# Patient Record
Sex: MALE | Race: WHITE | Employment: OTHER | URBAN - METROPOLITAN AREA
[De-identification: names, ages, dates, MRNs, and addresses within clinical notes are randomized per-mention and may not be internally consistent; named-entity substitution may affect disease eponyms.]

---

## 2017-02-27 ENCOUNTER — ANESTHESIA EVENT (OUTPATIENT)
Dept: SURGERY | Facility: CLINIC | Age: 67
DRG: 470 | End: 2017-02-27
Payer: MEDICARE

## 2017-02-27 ENCOUNTER — HOSPITAL ENCOUNTER (INPATIENT)
Facility: CLINIC | Age: 67
LOS: 1 days | Discharge: HOME | DRG: 470 | End: 2017-02-28
Attending: ORTHOPAEDIC SURGERY | Admitting: ORTHOPAEDIC SURGERY
Payer: MEDICARE

## 2017-02-27 ENCOUNTER — ANESTHESIA (OUTPATIENT)
Dept: SURGERY | Facility: CLINIC | Age: 67
DRG: 470 | End: 2017-02-27
Payer: MEDICARE

## 2017-02-27 ENCOUNTER — APPOINTMENT (OUTPATIENT)
Dept: GENERAL RADIOLOGY | Facility: CLINIC | Age: 67
DRG: 470 | End: 2017-02-27
Attending: ORTHOPAEDIC SURGERY
Payer: MEDICARE

## 2017-02-27 DIAGNOSIS — M19.172 POST-TRAUMATIC ARTHRITIS OF ANKLE, LEFT: Primary | ICD-10-CM

## 2017-02-27 PROCEDURE — 71000012 ZZH RECOVERY PHASE 1 LEVEL 1 FIRST HR: Performed by: ORTHOPAEDIC SURGERY

## 2017-02-27 PROCEDURE — 25000128 H RX IP 250 OP 636: Performed by: NURSE ANESTHETIST, CERTIFIED REGISTERED

## 2017-02-27 PROCEDURE — 25000128 H RX IP 250 OP 636: Performed by: ORTHOPAEDIC SURGERY

## 2017-02-27 PROCEDURE — 25000132 ZZH RX MED GY IP 250 OP 250 PS 637: Mod: GY | Performed by: ORTHOPAEDIC SURGERY

## 2017-02-27 PROCEDURE — 25800025 ZZH RX 258: Performed by: ANESTHESIOLOGY

## 2017-02-27 PROCEDURE — A9270 NON-COVERED ITEM OR SERVICE: HCPCS | Mod: GY | Performed by: ORTHOPAEDIC SURGERY

## 2017-02-27 PROCEDURE — 71000013 ZZH RECOVERY PHASE 1 LEVEL 1 EA ADDTL HR: Performed by: ORTHOPAEDIC SURGERY

## 2017-02-27 PROCEDURE — C1713 ANCHOR/SCREW BN/BN,TIS/BN: HCPCS | Performed by: ORTHOPAEDIC SURGERY

## 2017-02-27 PROCEDURE — 36000063 ZZH SURGERY LEVEL 4 EA 15 ADDTL MIN: Performed by: ORTHOPAEDIC SURGERY

## 2017-02-27 PROCEDURE — 37000009 ZZH ANESTHESIA TECHNICAL FEE, EACH ADDTL 15 MIN: Performed by: ORTHOPAEDIC SURGERY

## 2017-02-27 PROCEDURE — 40000277 XR SURGERY CARM FLUORO LESS THAN 5 MIN W STILLS: Mod: TC

## 2017-02-27 PROCEDURE — 40000171 ZZH STATISTIC PRE-PROCEDURE ASSESSMENT III: Performed by: ORTHOPAEDIC SURGERY

## 2017-02-27 PROCEDURE — 37000008 ZZH ANESTHESIA TECHNICAL FEE, 1ST 30 MIN: Performed by: ORTHOPAEDIC SURGERY

## 2017-02-27 PROCEDURE — 36000065 ZZH SURGERY LEVEL 4 W FLUORO 1ST 30 MIN: Performed by: ORTHOPAEDIC SURGERY

## 2017-02-27 PROCEDURE — 25000125 ZZHC RX 250: Performed by: ANESTHESIOLOGY

## 2017-02-27 PROCEDURE — 0SRG0JA REPLACEMENT OF LEFT ANKLE JOINT WITH SYNTHETIC SUBSTITUTE, UNCEMENTED, OPEN APPROACH: ICD-10-PCS | Performed by: ORTHOPAEDIC SURGERY

## 2017-02-27 PROCEDURE — 25000566 ZZH SEVOFLURANE, EA 15 MIN: Performed by: ORTHOPAEDIC SURGERY

## 2017-02-27 PROCEDURE — 12000011 ZZH R&B MS OVERFLOW

## 2017-02-27 PROCEDURE — 25800025 ZZH RX 258: Performed by: ORTHOPAEDIC SURGERY

## 2017-02-27 PROCEDURE — 25000125 ZZHC RX 250: Performed by: ORTHOPAEDIC SURGERY

## 2017-02-27 PROCEDURE — 27211024 ZZHC OR SUPPLY OTHER OPNP: Performed by: ORTHOPAEDIC SURGERY

## 2017-02-27 PROCEDURE — 27210995 ZZH RX 272: Performed by: ORTHOPAEDIC SURGERY

## 2017-02-27 PROCEDURE — C1776 JOINT DEVICE (IMPLANTABLE): HCPCS | Performed by: ORTHOPAEDIC SURGERY

## 2017-02-27 PROCEDURE — 0SGJ04Z FUSION OF LEFT TARSAL JOINT WITH INTERNAL FIXATION DEVICE, OPEN APPROACH: ICD-10-PCS | Performed by: ORTHOPAEDIC SURGERY

## 2017-02-27 PROCEDURE — 27210794 ZZH OR GENERAL SUPPLY STERILE: Performed by: ORTHOPAEDIC SURGERY

## 2017-02-27 PROCEDURE — 0L8P0ZZ DIVISION OF LEFT LOWER LEG TENDON, OPEN APPROACH: ICD-10-PCS | Performed by: ORTHOPAEDIC SURGERY

## 2017-02-27 PROCEDURE — 25000125 ZZHC RX 250: Performed by: NURSE ANESTHETIST, CERTIFIED REGISTERED

## 2017-02-27 DEVICE — IMPLANTABLE DEVICE: Type: IMPLANTABLE DEVICE | Site: FOOT | Status: FUNCTIONAL

## 2017-02-27 RX ORDER — HYDROMORPHONE HCL/0.9% NACL/PF 0.2MG/0.2
.2-.3 SYRINGE (ML) INTRAVENOUS
Status: DISCONTINUED | OUTPATIENT
Start: 2017-02-27 | End: 2017-02-28 | Stop reason: HOSPADM

## 2017-02-27 RX ORDER — MAGNESIUM HYDROXIDE 1200 MG/15ML
LIQUID ORAL PRN
Status: DISCONTINUED | OUTPATIENT
Start: 2017-02-27 | End: 2017-02-27 | Stop reason: HOSPADM

## 2017-02-27 RX ORDER — CEFAZOLIN SODIUM 2 G/100ML
2 INJECTION, SOLUTION INTRAVENOUS
Status: COMPLETED | OUTPATIENT
Start: 2017-02-27 | End: 2017-02-27

## 2017-02-27 RX ORDER — ACETAMINOPHEN 325 MG/1
975 TABLET ORAL EVERY 8 HOURS
Status: DISCONTINUED | OUTPATIENT
Start: 2017-02-27 | End: 2017-02-28 | Stop reason: HOSPADM

## 2017-02-27 RX ORDER — TRIAMCINOLONE ACETONIDE 40 MG/ML
INJECTION, SUSPENSION INTRA-ARTICULAR; INTRAMUSCULAR PRN
Status: DISCONTINUED | OUTPATIENT
Start: 2017-02-27 | End: 2017-02-27 | Stop reason: HOSPADM

## 2017-02-27 RX ORDER — CEFAZOLIN SODIUM 2 G/100ML
2 INJECTION, SOLUTION INTRAVENOUS EVERY 8 HOURS
Status: COMPLETED | OUTPATIENT
Start: 2017-02-27 | End: 2017-02-28

## 2017-02-27 RX ORDER — DIMENHYDRINATE 50 MG/ML
25 INJECTION, SOLUTION INTRAMUSCULAR; INTRAVENOUS
Status: DISCONTINUED | OUTPATIENT
Start: 2017-02-27 | End: 2017-02-27 | Stop reason: HOSPADM

## 2017-02-27 RX ORDER — SODIUM CHLORIDE, SODIUM LACTATE, POTASSIUM CHLORIDE, CALCIUM CHLORIDE 600; 310; 30; 20 MG/100ML; MG/100ML; MG/100ML; MG/100ML
INJECTION, SOLUTION INTRAVENOUS CONTINUOUS
Status: DISCONTINUED | OUTPATIENT
Start: 2017-02-27 | End: 2017-02-27 | Stop reason: HOSPADM

## 2017-02-27 RX ORDER — OXYCODONE HYDROCHLORIDE 5 MG/1
5-10 TABLET ORAL EVERY 4 HOURS PRN
Status: DISCONTINUED | OUTPATIENT
Start: 2017-02-27 | End: 2017-02-28 | Stop reason: HOSPADM

## 2017-02-27 RX ORDER — HYDROXYZINE HYDROCHLORIDE 10 MG/1
10 TABLET, FILM COATED ORAL EVERY 6 HOURS PRN
Status: DISCONTINUED | OUTPATIENT
Start: 2017-02-27 | End: 2017-02-28 | Stop reason: HOSPADM

## 2017-02-27 RX ORDER — DEXAMETHASONE SODIUM PHOSPHATE 4 MG/ML
INJECTION, SOLUTION INTRA-ARTICULAR; INTRALESIONAL; INTRAMUSCULAR; INTRAVENOUS; SOFT TISSUE PRN
Status: DISCONTINUED | OUTPATIENT
Start: 2017-02-27 | End: 2017-02-27

## 2017-02-27 RX ORDER — CEFAZOLIN SODIUM 1 G/3ML
1 INJECTION, POWDER, FOR SOLUTION INTRAMUSCULAR; INTRAVENOUS SEE ADMIN INSTRUCTIONS
Status: DISCONTINUED | OUTPATIENT
Start: 2017-02-27 | End: 2017-02-27 | Stop reason: HOSPADM

## 2017-02-27 RX ORDER — EPHEDRINE SULFATE 50 MG/ML
INJECTION, SOLUTION INTRAVENOUS PRN
Status: DISCONTINUED | OUTPATIENT
Start: 2017-02-27 | End: 2017-02-27

## 2017-02-27 RX ORDER — PROCHLORPERAZINE MALEATE 5 MG
5 TABLET ORAL EVERY 6 HOURS PRN
Status: DISCONTINUED | OUTPATIENT
Start: 2017-02-27 | End: 2017-02-28 | Stop reason: HOSPADM

## 2017-02-27 RX ORDER — PROPOFOL 10 MG/ML
INJECTION, EMULSION INTRAVENOUS CONTINUOUS PRN
Status: DISCONTINUED | OUTPATIENT
Start: 2017-02-27 | End: 2017-02-27

## 2017-02-27 RX ORDER — FENTANYL CITRATE 50 UG/ML
INJECTION, SOLUTION INTRAMUSCULAR; INTRAVENOUS PRN
Status: DISCONTINUED | OUTPATIENT
Start: 2017-02-27 | End: 2017-02-27

## 2017-02-27 RX ORDER — LIDOCAINE 40 MG/G
CREAM TOPICAL
Status: DISCONTINUED | OUTPATIENT
Start: 2017-02-27 | End: 2017-02-28 | Stop reason: HOSPADM

## 2017-02-27 RX ORDER — HYDROMORPHONE HYDROCHLORIDE 1 MG/ML
.3-.5 INJECTION, SOLUTION INTRAMUSCULAR; INTRAVENOUS; SUBCUTANEOUS EVERY 5 MIN PRN
Status: DISCONTINUED | OUTPATIENT
Start: 2017-02-27 | End: 2017-02-27 | Stop reason: HOSPADM

## 2017-02-27 RX ORDER — PROPOFOL 10 MG/ML
INJECTION, EMULSION INTRAVENOUS PRN
Status: DISCONTINUED | OUTPATIENT
Start: 2017-02-27 | End: 2017-02-27

## 2017-02-27 RX ORDER — ASPIRIN 81 MG/1
162 TABLET ORAL DAILY
Status: DISCONTINUED | OUTPATIENT
Start: 2017-02-27 | End: 2017-02-28 | Stop reason: HOSPADM

## 2017-02-27 RX ORDER — HYDRALAZINE HYDROCHLORIDE 20 MG/ML
2.5-5 INJECTION INTRAMUSCULAR; INTRAVENOUS EVERY 10 MIN PRN
Status: DISCONTINUED | OUTPATIENT
Start: 2017-02-27 | End: 2017-02-27 | Stop reason: HOSPADM

## 2017-02-27 RX ORDER — FENTANYL CITRATE 50 UG/ML
25-50 INJECTION, SOLUTION INTRAMUSCULAR; INTRAVENOUS
Status: DISCONTINUED | OUTPATIENT
Start: 2017-02-27 | End: 2017-02-27 | Stop reason: HOSPADM

## 2017-02-27 RX ORDER — ONDANSETRON 2 MG/ML
INJECTION INTRAMUSCULAR; INTRAVENOUS PRN
Status: DISCONTINUED | OUTPATIENT
Start: 2017-02-27 | End: 2017-02-27

## 2017-02-27 RX ORDER — LABETALOL HYDROCHLORIDE 5 MG/ML
10 INJECTION, SOLUTION INTRAVENOUS
Status: DISCONTINUED | OUTPATIENT
Start: 2017-02-27 | End: 2017-02-27 | Stop reason: HOSPADM

## 2017-02-27 RX ORDER — ONDANSETRON 2 MG/ML
4 INJECTION INTRAMUSCULAR; INTRAVENOUS EVERY 30 MIN PRN
Status: DISCONTINUED | OUTPATIENT
Start: 2017-02-27 | End: 2017-02-27 | Stop reason: HOSPADM

## 2017-02-27 RX ORDER — NALOXONE HYDROCHLORIDE 0.4 MG/ML
.1-.4 INJECTION, SOLUTION INTRAMUSCULAR; INTRAVENOUS; SUBCUTANEOUS
Status: DISCONTINUED | OUTPATIENT
Start: 2017-02-27 | End: 2017-02-28 | Stop reason: HOSPADM

## 2017-02-27 RX ORDER — ONDANSETRON 4 MG/1
4 TABLET, ORALLY DISINTEGRATING ORAL EVERY 30 MIN PRN
Status: DISCONTINUED | OUTPATIENT
Start: 2017-02-27 | End: 2017-02-27 | Stop reason: HOSPADM

## 2017-02-27 RX ORDER — ONDANSETRON 2 MG/ML
4 INJECTION INTRAMUSCULAR; INTRAVENOUS EVERY 6 HOURS PRN
Status: DISCONTINUED | OUTPATIENT
Start: 2017-02-27 | End: 2017-02-28 | Stop reason: HOSPADM

## 2017-02-27 RX ORDER — ONDANSETRON 4 MG/1
4 TABLET, ORALLY DISINTEGRATING ORAL EVERY 6 HOURS PRN
Status: DISCONTINUED | OUTPATIENT
Start: 2017-02-27 | End: 2017-02-28 | Stop reason: HOSPADM

## 2017-02-27 RX ORDER — LIDOCAINE 40 MG/G
CREAM TOPICAL
Status: DISCONTINUED | OUTPATIENT
Start: 2017-02-27 | End: 2017-02-27 | Stop reason: HOSPADM

## 2017-02-27 RX ORDER — LIDOCAINE HYDROCHLORIDE 10 MG/ML
INJECTION, SOLUTION INFILTRATION; PERINEURAL PRN
Status: DISCONTINUED | OUTPATIENT
Start: 2017-02-27 | End: 2017-02-27

## 2017-02-27 RX ORDER — GABAPENTIN 100 MG/1
300 CAPSULE ORAL 3 TIMES DAILY
Status: DISCONTINUED | OUTPATIENT
Start: 2017-02-27 | End: 2017-02-28 | Stop reason: HOSPADM

## 2017-02-27 RX ORDER — AMOXICILLIN 250 MG
1-2 CAPSULE ORAL 2 TIMES DAILY
Status: DISCONTINUED | OUTPATIENT
Start: 2017-02-27 | End: 2017-02-28 | Stop reason: HOSPADM

## 2017-02-27 RX ORDER — IBUPROFEN 600 MG/1
600 TABLET, FILM COATED ORAL EVERY 6 HOURS PRN
Status: DISCONTINUED | OUTPATIENT
Start: 2017-02-27 | End: 2017-02-28 | Stop reason: HOSPADM

## 2017-02-27 RX ORDER — ACETAMINOPHEN 325 MG/1
650 TABLET ORAL EVERY 4 HOURS PRN
Status: DISCONTINUED | OUTPATIENT
Start: 2017-03-02 | End: 2017-02-28 | Stop reason: HOSPADM

## 2017-02-27 RX ORDER — GLYCOPYRROLATE 0.2 MG/ML
INJECTION, SOLUTION INTRAMUSCULAR; INTRAVENOUS PRN
Status: DISCONTINUED | OUTPATIENT
Start: 2017-02-27 | End: 2017-02-27

## 2017-02-27 RX ORDER — SODIUM CHLORIDE, SODIUM LACTATE, POTASSIUM CHLORIDE, CALCIUM CHLORIDE 600; 310; 30; 20 MG/100ML; MG/100ML; MG/100ML; MG/100ML
INJECTION, SOLUTION INTRAVENOUS CONTINUOUS
Status: DISCONTINUED | OUTPATIENT
Start: 2017-02-27 | End: 2017-02-28 | Stop reason: HOSPADM

## 2017-02-27 RX ADMIN — GLYCOPYRROLATE 0.2 MG: 0.2 INJECTION, SOLUTION INTRAMUSCULAR; INTRAVENOUS at 12:32

## 2017-02-27 RX ADMIN — DEXAMETHASONE SODIUM PHOSPHATE 4 MG: 4 INJECTION, SOLUTION INTRAMUSCULAR; INTRAVENOUS at 12:32

## 2017-02-27 RX ADMIN — EPHEDRINE SULFATE 5 MG: 50 INJECTION, SOLUTION INTRAMUSCULAR; INTRAVENOUS; SUBCUTANEOUS at 12:43

## 2017-02-27 RX ADMIN — OXYCODONE HYDROCHLORIDE 10 MG: 5 TABLET ORAL at 18:53

## 2017-02-27 RX ADMIN — SODIUM CHLORIDE, POTASSIUM CHLORIDE, SODIUM LACTATE AND CALCIUM CHLORIDE: 600; 310; 30; 20 INJECTION, SOLUTION INTRAVENOUS at 17:49

## 2017-02-27 RX ADMIN — CEFAZOLIN SODIUM 2 G: 2 INJECTION, SOLUTION INTRAVENOUS at 20:18

## 2017-02-27 RX ADMIN — PROPOFOL 50 MCG/KG/MIN: 10 INJECTION, EMULSION INTRAVENOUS at 12:49

## 2017-02-27 RX ADMIN — GABAPENTIN 300 MG: 100 CAPSULE ORAL at 20:17

## 2017-02-27 RX ADMIN — ONDANSETRON 4 MG: 2 INJECTION INTRAMUSCULAR; INTRAVENOUS at 15:03

## 2017-02-27 RX ADMIN — EPHEDRINE SULFATE 5 MG: 50 INJECTION, SOLUTION INTRAMUSCULAR; INTRAVENOUS; SUBCUTANEOUS at 14:53

## 2017-02-27 RX ADMIN — LIDOCAINE HYDROCHLORIDE 10 ML: 20 JELLY TOPICAL at 17:06

## 2017-02-27 RX ADMIN — EPHEDRINE SULFATE 5 MG: 50 INJECTION, SOLUTION INTRAMUSCULAR; INTRAVENOUS; SUBCUTANEOUS at 12:51

## 2017-02-27 RX ADMIN — LIDOCAINE HYDROCHLORIDE 50 MG: 10 INJECTION, SOLUTION INFILTRATION; PERINEURAL at 12:32

## 2017-02-27 RX ADMIN — CEFAZOLIN 1 G: 1 INJECTION, POWDER, FOR SOLUTION INTRAMUSCULAR; INTRAVENOUS at 14:29

## 2017-02-27 RX ADMIN — PHENYLEPHRINE HYDROCHLORIDE 100 MCG: 10 INJECTION, SOLUTION INTRAMUSCULAR; INTRAVENOUS; SUBCUTANEOUS at 15:00

## 2017-02-27 RX ADMIN — ASPIRIN 162 MG: 81 TABLET, COATED ORAL at 17:50

## 2017-02-27 RX ADMIN — PHENYLEPHRINE HYDROCHLORIDE 100 MCG: 10 INJECTION, SOLUTION INTRAMUSCULAR; INTRAVENOUS; SUBCUTANEOUS at 13:20

## 2017-02-27 RX ADMIN — HYDROMORPHONE HYDROCHLORIDE 1 MG: 1 INJECTION, SOLUTION INTRAMUSCULAR; INTRAVENOUS; SUBCUTANEOUS at 14:44

## 2017-02-27 RX ADMIN — CEFAZOLIN SODIUM 2 G: 2 INJECTION, SOLUTION INTRAVENOUS at 12:30

## 2017-02-27 RX ADMIN — EPHEDRINE SULFATE 10 MG: 50 INJECTION, SOLUTION INTRAMUSCULAR; INTRAVENOUS; SUBCUTANEOUS at 13:05

## 2017-02-27 RX ADMIN — PHENYLEPHRINE HYDROCHLORIDE 100 MCG: 10 INJECTION, SOLUTION INTRAMUSCULAR; INTRAVENOUS; SUBCUTANEOUS at 15:24

## 2017-02-27 RX ADMIN — ACETAMINOPHEN 975 MG: 325 TABLET, FILM COATED ORAL at 17:50

## 2017-02-27 RX ADMIN — SODIUM CHLORIDE, POTASSIUM CHLORIDE, SODIUM LACTATE AND CALCIUM CHLORIDE: 600; 310; 30; 20 INJECTION, SOLUTION INTRAVENOUS at 15:21

## 2017-02-27 RX ADMIN — OXYCODONE HYDROCHLORIDE 10 MG: 5 TABLET ORAL at 23:21

## 2017-02-27 RX ADMIN — PROPOFOL 200 MG: 10 INJECTION, EMULSION INTRAVENOUS at 12:32

## 2017-02-27 RX ADMIN — FENTANYL CITRATE 100 MCG: 50 INJECTION, SOLUTION INTRAMUSCULAR; INTRAVENOUS at 12:32

## 2017-02-27 RX ADMIN — SODIUM CHLORIDE, POTASSIUM CHLORIDE, SODIUM LACTATE AND CALCIUM CHLORIDE: 600; 310; 30; 20 INJECTION, SOLUTION INTRAVENOUS at 12:30

## 2017-02-27 RX ADMIN — SODIUM CHLORIDE, POTASSIUM CHLORIDE, SODIUM LACTATE AND CALCIUM CHLORIDE: 600; 310; 30; 20 INJECTION, SOLUTION INTRAVENOUS at 13:21

## 2017-02-27 RX ADMIN — MIDAZOLAM HYDROCHLORIDE 2 MG: 1 INJECTION, SOLUTION INTRAMUSCULAR; INTRAVENOUS at 12:30

## 2017-02-27 RX ADMIN — SENNOSIDES AND DOCUSATE SODIUM 1 TABLET: 8.6; 5 TABLET ORAL at 20:20

## 2017-02-27 ASSESSMENT — PAIN DESCRIPTION - DESCRIPTORS: DESCRIPTORS: DULL;THROBBING

## 2017-02-27 NOTE — ANESTHESIA CARE TRANSFER NOTE
Patient: Donny Cleveland    Procedure(s):  Left total ankle arthroplasty, left subtalor fusion        - Wound Class: I-Clean    Diagnosis: left DJD,   Diagnosis Additional Information: No value filed.    Anesthesia Type:   General, Periph. Nerve Block for postop pain, ETT     Note:  Airway :Face Mask  Patient transferred to:PACU        Vitals: (Last set prior to Anesthesia Care Transfer)    CRNA VITALS  2/27/2017 1514 - 2/27/2017 1550      2/27/2017             Pulse: 84    SpO2: 100 %                Electronically Signed By: SHARON Dewitt CRNA  February 27, 2017  3:50 PM

## 2017-02-27 NOTE — ANESTHESIA PREPROCEDURE EVALUATION
Anesthesia Evaluation     . Pt has had prior anesthetic. Type: General      ROS/MED HX    ENT/Pulmonary:     (+)Intermittent asthma Treatment: Inhaler prn,  , . .    Neurologic:  - neg neurologic ROS     Cardiovascular:  - neg cardiovascular ROS       METS/Exercise Tolerance:     Hematologic:  - neg hematologic  ROS       Musculoskeletal:   (+) , , other musculoskeletal- ankle pain      GI/Hepatic:  - neg GI/hepatic ROS       Renal/Genitourinary:  - ROS Renal section negative       Endo:  - neg endo ROS       Psychiatric:  - neg psychiatric ROS       Infectious Disease:  - neg infectious disease ROS       Malignancy:      - no malignancy   Other:    (+) No chance of pregnancy C-spine cleared: N/A, H/O Chronic Pain,no other significant disability              Physical Exam  Normal systems: cardiovascular, pulmonary and dental    Airway   Mallampati: II  TM distance: >3 FB  Neck ROM: full    Dental     Cardiovascular       Pulmonary                     Anesthesia Plan      History & Physical Review  History and physical reviewed and following examination; no interval change.    ASA Status:  2 .    NPO Status:  > 8 hours    Plan for General, Periph. Nerve Block for postop pain and ETT with Intravenous induction. Maintenance will be Balanced.    PONV prophylaxis:  Ondansetron (or other 5HT-3) and Dexamethasone or Solumedrol       Postoperative Care  Postoperative pain management:  IV analgesics.      Consents  Anesthetic plan, risks, benefits and alternatives discussed with:  Patient.  Use of blood products discussed: Yes.   Use of blood products discussed with Patient.  .                          .

## 2017-02-27 NOTE — ANESTHESIA POSTPROCEDURE EVALUATION
Patient: Donny Cleveland    Procedure(s):  Left total ankle arthroplasty, left subtalor fusion        - Wound Class: I-Clean    Diagnosis:left DJD,   Diagnosis Additional Information: Pre-operative diagnosis: Left ankle arthritis  Left subtalar joint arthritis   Post-operative diagnosis Left ankle arthritis  Left subtalar joint arthritis  Achilles tendon contracture, left  Procedure: 1) Left total ankle arthroplasty  2) Left subta, lar joint arthrodesis  3) TendoAchilles lengthening, left        Anesthesia Type:  General, ETT, Peripheral Nerve Block for post-op pain at surgeon's request    Note:  Anesthesia Post Evaluation    Patient location during evaluation: PACU  Patient participation: Able to fully participate in evaluation  Level of consciousness: awake and alert  Pain management: adequate  Airway patency: patent  Cardiovascular status: acceptable  Respiratory status: acceptable  Hydration status: acceptable  PONV: none     Anesthetic complications: None          Last vitals:  Vitals:    02/27/17 1630 02/27/17 1640 02/27/17 1645   BP: 116/77     Resp: 24 12 23   Temp:      SpO2: 95% 97% 96%         Electronically Signed By: Cosme Capps MD  February 27, 2017  4:56 PM

## 2017-02-27 NOTE — IP AVS SNAPSHOT
MRN:[id number]                      After Visit Summary   2/27/2017    Donny Cleveland    MRN: [id number]           Thank you!     Thank you for choosing [facility] for your care. Our goal is always to provide you with excellent care. Hearing back from our patients is one way we can continue to improve our services. Please take a few minutes to complete the written survey that you may receive in the mail after you visit. If you would like to speak to someone directly about your visit please contact Patient Relations at [phone]. Thank you!          Patient Information     Date Of Birth          1950        About your hospital stay     You were admitted on:  February 27, 2017 You last received care in the:  [facility]    You were discharged on:  February 28, 2017        Reason for your hospital stay       Patient was admitted following a left total ankle arthroplasty and subtalar arthrodesis.  He has done well post-operatively and would like to go home. Please see chart for full details                  Who to Call     For medical emergencies, please call 911.  For non-urgent questions about your medical care, please call your primary care provider or clinic, [phone]  For questions related to your surgery, please call your surgery clinic        Attending Provider     Provider Lorenzo Choudhary MD Orthopaedic Surgery       Primary Care Provider Office Phone # Fax #    Tommy Piedra [phone]       [facility] [address] [facility] [address]         When to contact your care team       Call Dr Cope if you have any of the following: temperature greater than 100, increased pain, increased shortness of breath or cast concerns                  After Care Instructions     Activity       Your activity upon discharge: bedrest, elevate at heart level for edema control.  No weightbearing left ankle full  "time            Diet       Follow this diet upon discharge: Regular, get plenty of fiber and water while on narcotic pain medication            Wound care and dressings       Instructions to care for your wound at home: keep cast clean and dry.                  Follow-up Appointments     Follow-up and recommended labs and tests        Follow up with Dr. Cope's office, at [address] [place] [address], within 2 weeks  to evaluate after surgery. No follow up labs or test are needed.                  Pending Results     No orders found for last 3 day(s).            Statement of Approval     Ordered          17 1410  I have reviewed and agree with all the recommendations and orders detailed in this document.  EFFECTIVE NOW     Approved and electronically signed by:  Aminata Walker PA-C             Admission Information     Date & Time Provider Department Dept. Phone    2017 Lorenzo Cope MD [facility] [phone]      Your Vitals Were     Blood Pressure Temperature Respirations Height Weight Pulse Oximetry    136/90 (BP Location: Right arm) 98.5  F (36.9  C) (Oral) 16 1.829 m (6') 112 kg (247 lb) 100%    BMI (Body Mass Index)                   33.5 kg/m2           MyChart Information     [employer] lets you send messages to your doctor, view your test results, renew your prescriptions, schedule appointments and more. To sign up, go to [email or web address] . Click on \"Log in\" on the left side of the screen, which will take you to the Welcome page. Then click on \"Sign up Now\" on the right side of the page.     You will be asked to enter the access code listed below, as well as some personal information. Please follow the directions to create your username and password.     Your access code is: [email or web address]  Expires: 2017  2:16 PM     Your access code will  in 90 days. If you need help or a new code, please call your [facility] clinic or " [phone].        Care EveryWhere ID     This is your Care EveryWhere ID. This could be used by other organizations to access your [facility] medical records  [id number]           Review of your medicines      START taking        Dose / Directions    acetaminophen 325 MG tablet   Commonly known as:  TYLENOL        Dose:  975 mg   Take 3 tablets (975 mg) by mouth every 8 hours   Quantity:  50 tablet   Refills:  1       aspirin 162 MG EC tablet        Dose:  162 mg   Take 1 tablet (162 mg) by mouth daily   Quantity:  14 tablet   Refills:  0       gabapentin 300 MG capsule   Commonly known as:  NEURONTIN        Dose:  300 mg   Take 1 capsule (300 mg) by mouth 3 times daily   Quantity:  9 capsule   Refills:  0       ibuprofen 600 MG tablet   Commonly known as:  ADVIL/MOTRIN        Dose:  600 mg   Take 1 tablet (600 mg) by mouth every 6 hours as needed for moderate pain   Quantity:  50 tablet   Refills:  1       oxyCODONE 5 MG IR tablet   Commonly known as:  ROXICODONE        Dose:  5-10 mg   Take 1-2 tablets (5-10 mg) by mouth every 4 hours as needed for moderate to severe pain   Quantity:  24 tablet   Refills:  0       senna-docusate 8.6-50 MG per tablet   Commonly known as:  SENOKOT-S;PERICOLACE        Dose:  1-2 tablet   Take 1-2 tablets by mouth 2 times daily   Quantity:  40 tablet   Refills:  1            Where to get your medicines      Some of these will need a paper prescription and others can be bought over the counter. Ask your nurse if you have questions.     You don't need a prescription for these medications     acetaminophen 325 MG tablet    aspirin 162 MG EC tablet    gabapentin 300 MG capsule    ibuprofen 600 MG tablet    senna-docusate 8.6-50 MG per tablet         Information about where to get these medications is not yet available     ! Ask your nurse or doctor about these medications     oxyCODONE 5 MG IR tablet                Protect others around you: Learn how to safely use, store and throw  away your medicines at www.disposemymeds.org.             Medication List: This is a list of all your medications and when to take them. Check marks below indicate your daily home schedule. Keep this list as a reference.      Medications           Morning Afternoon Evening Bedtime As Needed    acetaminophen 325 MG tablet   Commonly known as:  TYLENOL   Take 3 tablets (975 mg) by mouth every 8 hours   Last time this was given:  975 mg on 2/28/2017  9:35 AM                                aspirin 162 MG EC tablet   Take 1 tablet (162 mg) by mouth daily   Last time this was given:  162 mg on 2/28/2017  9:36 AM                                gabapentin 300 MG capsule   Commonly known as:  NEURONTIN   Take 1 capsule (300 mg) by mouth 3 times daily   Last time this was given:  300 mg on 2/28/2017  1:28 PM                                ibuprofen 600 MG tablet   Commonly known as:  ADVIL/MOTRIN   Take 1 tablet (600 mg) by mouth every 6 hours as needed for moderate pain   Last time this was given:  600 mg on 2/28/2017 12:16 PM                                oxyCODONE 5 MG IR tablet   Commonly known as:  ROXICODONE   Take 1-2 tablets (5-10 mg) by mouth every 4 hours as needed for moderate to severe pain   Last time this was given:  10 mg on 2/28/2017  1:28 PM                                senna-docusate 8.6-50 MG per tablet   Commonly known as:  SENOKOT-S;PERICOLACE   Take 1-2 tablets by mouth 2 times daily   Last time this was given:  1 tablet on 2/28/2017  9:35 AM

## 2017-02-27 NOTE — BRIEF OP NOTE
[facility] Brief Operative Note    Pre-operative diagnosis: Left ankle arthritis  Left subtalar joint arthritis    Post-operative diagnosis Left ankle arthritis  Left subtalar joint arthritis  Achilles tendon contracture, left   Procedure: 1) Left total ankle arthroplasty  2) Left subtalar joint arthrodesis  3) TendoAchilles lengthening, left   Surgeon(s): Surgeon(s) and Role:     * Lorenzo Cope MD - Primary     * Aminata Walker PA-C - Assisting   Estimated blood loss: 50mL   Specimens: None   Findings: Anes: Regional + General  IMPLANTS: Integra Linnea (Tibia 4x, Talus 3, Poly 6mm), large fragment

## 2017-02-27 NOTE — ANESTHESIA PROCEDURE NOTES
Peripheral nerve/Neuraxial procedure note : Sciatic  Pre-Procedure  Performed by HENRY HICKS  Location: pre-op    Procedure Times:2/27/2017 11:32 AM and 2/27/2017 11:47 AM  Pre-Anesthestic Checklist: patient identified, IV checked, site marked, risks and benefits discussed, informed consent, monitors and equipment checked, pre-op evaluation, at physician/surgeon's request and post-op pain management    Timeout  Correct Patient: Yes   Correct Procedure: Yes   Correct Site: Yes   Correct Laterality: Yes   Correct Position: Yes   Site Marked: Yes   .   Procedure Documentation    .    Procedure:    Sciatic.     Ultrasound used to identify targeted nerve, plexus, or vascular marker and placed a needle adjacent to it.   Patient Prep;mask, sterile gloves, povidone-iodine 7.5% surgical scrub.  Nerve Stim: Initial Level 1 mA. Lowest motor response mA..  Needle: insulated, short bevel (22 G. 2 in). .  Spinal Needle: . . Insertion Method: Single Shot.     Assessment/Narrative  Paresthesias: No.  .  The placement was negative for: blood aspirated, painful injection and site bleeding.  Bolus given via needle. No blood aspirated via catheter.   Secured via.   Complications: none. Test dose of mL lidocaine 2% w/ 1:200,000 epinephrine at. Test dose negative for signs of intravascular, subdural or intrathecal injection. Comments:  The surgeon has given a verbal order transferring care of this patient to me for the performance of a regional analgesia block for post-op pain control. It is requested of me because I am uniquely trained and qualified to perform this block and the surgeon is neither trained nor qualified to perform this procedure.    30ml of 0.5% Bupivicaine w/ 1:200,000 epi + 10ml of 2% Lidocaine injected

## 2017-02-27 NOTE — IP AVS SNAPSHOT
[facility]    [address]    [facility] [address]    Phone:  [phone]    Fax:  [phone]                                       After Visit Summary   2/27/2017    Donny Cleveland    MRN: [id number]           After Visit Summary Signature Page     I have received my discharge instructions, and my questions have been answered. I have discussed any challenges I see with this plan with the nurse or doctor.    ..........................................................................................................................................  Patient/Patient Representative Signature      ..........................................................................................................................................  Patient Representative Print Name and Relationship to Patient    ..................................................               ................................................  Date                                            Time    ..........................................................................................................................................  Reviewed by Signature/Title    ...................................................              ..............................................  Date                                                            Time

## 2017-02-28 ENCOUNTER — APPOINTMENT (OUTPATIENT)
Dept: PHYSICAL THERAPY | Facility: CLINIC | Age: 67
DRG: 470 | End: 2017-02-28
Attending: ORTHOPAEDIC SURGERY
Payer: MEDICARE

## 2017-02-28 ENCOUNTER — APPOINTMENT (OUTPATIENT)
Dept: OCCUPATIONAL THERAPY | Facility: CLINIC | Age: 67
DRG: 470 | End: 2017-02-28
Attending: ORTHOPAEDIC SURGERY
Payer: MEDICARE

## 2017-02-28 VITALS
TEMPERATURE: 98.5 F | OXYGEN SATURATION: 100 % | HEIGHT: 72 IN | BODY MASS INDEX: 33.46 KG/M2 | RESPIRATION RATE: 16 BRPM | SYSTOLIC BLOOD PRESSURE: 136 MMHG | DIASTOLIC BLOOD PRESSURE: 90 MMHG | WEIGHT: 247 LBS

## 2017-02-28 LAB
GLUCOSE SERPL-MCNC: 142 MG/DL (ref 70–99)
HGB BLD-MCNC: 13.3 G/DL (ref 13.3–17.7)

## 2017-02-28 PROCEDURE — 97530 THERAPEUTIC ACTIVITIES: CPT | Mod: GP | Performed by: PHYSICAL THERAPIST

## 2017-02-28 PROCEDURE — 40000193 ZZH STATISTIC PT WARD VISIT: Performed by: PHYSICAL THERAPIST

## 2017-02-28 PROCEDURE — 82947 ASSAY GLUCOSE BLOOD QUANT: CPT | Performed by: ORTHOPAEDIC SURGERY

## 2017-02-28 PROCEDURE — 40000133 ZZH STATISTIC OT WARD VISIT: Performed by: REHABILITATION PRACTITIONER

## 2017-02-28 PROCEDURE — 97535 SELF CARE MNGMENT TRAINING: CPT | Mod: GO | Performed by: REHABILITATION PRACTITIONER

## 2017-02-28 PROCEDURE — A9270 NON-COVERED ITEM OR SERVICE: HCPCS | Mod: GY | Performed by: ORTHOPAEDIC SURGERY

## 2017-02-28 PROCEDURE — 97161 PT EVAL LOW COMPLEX 20 MIN: CPT | Mod: GP | Performed by: PHYSICAL THERAPIST

## 2017-02-28 PROCEDURE — 25000132 ZZH RX MED GY IP 250 OP 250 PS 637: Mod: GY | Performed by: ORTHOPAEDIC SURGERY

## 2017-02-28 PROCEDURE — 36415 COLL VENOUS BLD VENIPUNCTURE: CPT | Performed by: ORTHOPAEDIC SURGERY

## 2017-02-28 PROCEDURE — 25000128 H RX IP 250 OP 636: Performed by: ORTHOPAEDIC SURGERY

## 2017-02-28 PROCEDURE — 97116 GAIT TRAINING THERAPY: CPT | Mod: GP | Performed by: PHYSICAL THERAPIST

## 2017-02-28 PROCEDURE — 85018 HEMOGLOBIN: CPT | Performed by: ORTHOPAEDIC SURGERY

## 2017-02-28 PROCEDURE — 97165 OT EVAL LOW COMPLEX 30 MIN: CPT | Mod: GO | Performed by: REHABILITATION PRACTITIONER

## 2017-02-28 RX ORDER — OXYCODONE HYDROCHLORIDE 5 MG/1
5-10 TABLET ORAL EVERY 4 HOURS PRN
Qty: 24 TABLET | Refills: 0 | Status: SHIPPED | DISCHARGE
Start: 2017-02-28 | End: 2017-11-07

## 2017-02-28 RX ORDER — AMOXICILLIN 250 MG
1-2 CAPSULE ORAL 2 TIMES DAILY
Qty: 40 TABLET | Refills: 1 | DISCHARGE
Start: 2017-02-28 | End: 2017-11-07

## 2017-02-28 RX ORDER — GABAPENTIN 300 MG/1
300 CAPSULE ORAL 3 TIMES DAILY
Qty: 9 CAPSULE | DISCHARGE
Start: 2017-02-28 | End: 2017-11-07

## 2017-02-28 RX ORDER — ACETAMINOPHEN 325 MG/1
975 TABLET ORAL EVERY 8 HOURS
Qty: 50 TABLET | Refills: 1 | DISCHARGE
Start: 2017-02-28 | End: 2017-11-09

## 2017-02-28 RX ORDER — IBUPROFEN 600 MG/1
600 TABLET, FILM COATED ORAL EVERY 6 HOURS PRN
Qty: 50 TABLET | Refills: 1 | DISCHARGE
Start: 2017-02-28 | End: 2021-03-11

## 2017-02-28 RX ADMIN — OXYCODONE HYDROCHLORIDE 10 MG: 5 TABLET ORAL at 13:28

## 2017-02-28 RX ADMIN — IBUPROFEN 600 MG: 600 TABLET ORAL at 12:16

## 2017-02-28 RX ADMIN — ACETAMINOPHEN 975 MG: 325 TABLET, FILM COATED ORAL at 09:35

## 2017-02-28 RX ADMIN — CEFAZOLIN SODIUM 2 G: 2 INJECTION, SOLUTION INTRAVENOUS at 04:10

## 2017-02-28 RX ADMIN — GABAPENTIN 300 MG: 100 CAPSULE ORAL at 13:28

## 2017-02-28 RX ADMIN — OXYCODONE HYDROCHLORIDE 10 MG: 5 TABLET ORAL at 09:39

## 2017-02-28 RX ADMIN — GABAPENTIN 300 MG: 100 CAPSULE ORAL at 09:36

## 2017-02-28 RX ADMIN — OXYCODONE HYDROCHLORIDE 10 MG: 5 TABLET ORAL at 05:25

## 2017-02-28 RX ADMIN — SENNOSIDES AND DOCUSATE SODIUM 1 TABLET: 8.6; 5 TABLET ORAL at 09:35

## 2017-02-28 RX ADMIN — ASPIRIN 162 MG: 81 TABLET, COATED ORAL at 09:36

## 2017-02-28 RX ADMIN — ACETAMINOPHEN 975 MG: 325 TABLET, FILM COATED ORAL at 02:07

## 2017-02-28 ASSESSMENT — ACTIVITIES OF DAILY LIVING (ADL): PREVIOUS_RESPONSIBILITIES: MEAL PREP;HOUSEKEEPING;SHOPPING;YARDWORK;MEDICATION MANAGEMENT;FINANCES;DRIVING;WORK

## 2017-02-28 NOTE — PROGRESS NOTES
AVS printed and reviewed with patient and wife. Follow up appointment scheduled. No questions or concerns. Medications being filled in pharmacy.

## 2017-02-28 NOTE — PLAN OF CARE
Problem: Goal Outcome Summary  Goal: Goal Outcome Summary  PT: Zully complete, recommend home with family assist as needed. Pt was able to go the length of the hallway on the roll-a-bout independently and also did 1 platform step with the walker maintaining NWB status min to mod Ax1, practiced hopping up forward and backwards, wife concerned that pt's foot is still numb from the block. Pt has equipment he needs for home. Will plan to practice again this PM.

## 2017-02-28 NOTE — PROGRESS NOTES
S:  POD #1 s/p left total ankle arthroplasty and subtalar arthrodesis.  He reports he is doing well at this time.  He has worked with PT and feels comfortable with getting around.  His block has not fully worn off and he is reporting no pain at this time.  He would like to go home today.    O:  T: 98.5, HR: 76,  R: 16,  BP: 136/90,  SpO2: 100  Patient is lying in bed with his left ankle elevated on pillows.  His cast is clean, dry and intact.  There is a small amount of blood laterally from an incident where his IV came out.  He is able to bend the knee, but cannot wiggle the toes at this time.  His toes are warm to touch, capillary refill is appropriate, but he denies full sensation, he has some sensation starting to return.  His right calf is soft and nontender to palpation.    A/P:  POD #1 s/p left total ankle arthroplasty, subtalar arthrodesis, doing well  No weightbearing left ankle full time  Elevate at heart level for edema control  Oral medication as needed for pain  Aspirin for DVT prophylaxis  Patient may d/c home later today.    Wild Walker PA-C

## 2017-02-28 NOTE — OP NOTE
PREOPERATIVE DIAGNOSES:   1.  Left ankle arthritis.   2.  Left subtalar joint arthritis.   3.  Varus deformity, left ankle.      POSTOPERATIVE DIAGNOSES:     1.  Left ankle arthritis.   2.  Left subtalar joint arthritis.   3.  Varus deformity, left ankle.   4.  Achilles tendon contracture, left.      PROCEDURES:   1.  Left total ankle arthroplasty.   2.  Left subtalar joint arthrodesis.   3.  Tendo Achilles lengthening, left.      SURGEON:  Lorenzo Cope MD      ASSISTANT:  Aminata Walker PA-C      IMPLANTS:  Integra Linnea (tibia 4X, talus 3, poly 6 mm).      ANESTHESIA:  Regional plus general.      ESTIMATED BLOOD LOSS:  50 cc.      DRAINS:  None.      SPECIMENS:  None.      COMPLICATIONS:  None.      INDICATIONS FOR PROCEDURE:  The patient Donny Cleveland is a 66-year-old man who presented with left ankle and subtalar joint arthritis.  His ankle arthritis was post-traumatic, and he had a mild varus deformity through the tibiotalar joint.  After discussion with the patient regarding the risks, benefits and alternatives of surgery he elected to proceed with surgical treatment.  The patient was identified in the preoperative area and appropriately marked.  He underwent a regional anesthetic by the Anesthesia Team.        DESCRIPTION OF PROCEDURE:  The patient was brought to the operating room on 02/27/2017 where a general anesthetic was administered and an airway secured without difficulty.  Preoperative antibiotic prophylaxis was provided within 1 hour prior to the incision.  A tourniquet was placed on the left thigh.  The left leg and foot were prepped and draped in the sterile fashion.  The limb was elevated for exsanguination and the tourniquet inflated.  A pause for the cause was performed.      I made a direct anterior approach to the left ankle incorporating the previous small anterior incision.     The joint was exposed, and osteophytes were debrided.  The external tibial guide was placed and  positioned.  I did this for a somewhat smaller cut initially based on the deformity.  There was quite a bit of distraction of the tibiotalar joint with this placement.  I did do medial-sided release in order to be able to bring the talus into a level position with a lamina .      The tibial cutting block was positioned appropriately and pinned.  The distal tibia was cut.  Bone was removed.  I removed the 2 medial screws.  The anterior screw was out of my direct way, and therefore it was temporarily left in place.  We placed the distal tibial cutting guide.  I ensured that the tibia was reduced in the mortise before cutting.  The dorsal surface was cut.  The size 3 talar cutting block was placed and positioned with the use of the C-arm.  The posterior chamfer cut was made.  The anterior chamfer guide was then placed, and the anterior chamfer cut was made.        The 2 PEG holes were drilled for the talus.  The talus trial was placed and fit well.  At this point we attempted to place a tibial trial, but even with a 6 mm poly it was too tight.  I therefore removed    the anterior-posterior distal tibial screw using the broken screw removal set.  We cut an additional 4 mm off the distal tibia in order to comfortably fit a 6 mm poly which was then positioned and pinned.     I ensured on the lateral view that the guide was pinned in the appropriate location.  I then drilled the pegs.        The trials were all removed.  The wound was irrigated.  Excess bony fragments were removed.     The true components were then opened.  We placed the tibia followed by the talus and the poly.     The joint was quite tight, and this did not particularly improve with the knee flexed.  Therefore I did make a mental note that an Achilles tendon lengthening may be required.      First, however, we turned our attention to the subtalar joint.  The sinus tarsi approach was made.     The joint was prepped down to good cancellous bone on  both sides.  This was fish-scaled using    an osteotome on both sides.  The joint was then reduced and secured with a single 6.5 mm    partially-threaded cancellous screw.      AP, mortise and simulated weightbearing lateral views of the left ankle were taken and reviewed.  Position of the implant was good.  The position of the subtalar screw was good.  There was good joint compression.  The ankle was then placed again through a range of motion.  We confirmed that there was in fact still an Achilles tendon contracture.  Therefore I did perform a triple-cut [place]-type Achilles tendon lengthening.  I was then able to achieve approximately 5 degrees of passive dorsiflexion.      The remaining wounds were irrigated and closed.  Adaptic, sterile dressings and a well-padded short-leg cast were applied.  The patient tolerated the procedure well.  All sponge and needle counts were correct.      PLAN:     1.  Antibiotic prophylaxis will be continued for 24 hours post-surgery.   2.  The patient will be nonweightbearing in his cast.     3.  He will return in 2 weeks for cast-off wound check, sutures out and placement of a new short-leg nonweightbearing cast.     4.  At 4 weeks he will return for cast off and placement of an Aircast boot.     5.  He can then begin gentle ankle range of motion, but should remain nonweightbearing.     6.  At 4 weeks he should continue to wear the boot except for skin cares, changing clothes and    range of motion.   7.  At 6 weeks he will return for AP, mortise and lateral views of the left ankle with an axial view of the left heel.     8.  At that point the plan will be to initiate weightbearing in the boot and begin physical therapy for range of motion and strengthening.         MONET GALLARDO MD             D: 2017 07:41   T: 2017 12:21   MT: [id number]      Name:     JULIANA RODRIGEZ   MRN:      [id number]        Account:        [id number]   :      1950            Procedure Date: 02/27/2017      Document: [id number]       cc: Tommy Piedra MD

## 2017-02-28 NOTE — PLAN OF CARE
Problem: Goal Outcome Summary  Goal: Goal Outcome Summary  Outcome: No Change  /78  Temp 98  F (36.7  C) (Oral)  Resp 16  Ht 1.829 m (6')  Wt 112 kg (247 lb)  SpO2 96%  BMI 33.5 kg/m2  Transferred from PACU at 1730. Pt A&O, calm and cooperative with cares.  Lungs: clear, encouraged CDB and IS  BS: BS hypoactive, denies gas, tolerating clear liquid diet  Urine: [place] cath patent, voiding clear yellow urine   CMS: hard cast CDI, toes warm, reports sensation in ankle, unable to move toes, denies sensation in toes.  Pain: controlled with oral oxycodone, leg elevated on pillow  Activity: dangled at edge of bed. Tolerated well.   Will continue to monitor and provide supportive care.

## 2017-02-28 NOTE — PROGRESS NOTES
02/28/17 0849   Quick Adds   Type of Visit Initial PT Evaluation   Living Environment   Lives With spouse   Living Arrangements house   Number of Stairs to Enter Home 2  (platform, no rails)   Living Environment Comment will have assist to get in the house, spouse will be around to help   Self-Care   Equipment Currently Used at Home crutches, axillary;walker, rolling  (knee scooter, lift chair)   Activity/Exercise/Self-Care Comment was indep, states it just hurt when he did things   Functional Level Prior   Ambulation 0-->independent   Transferring 0-->independent   General Information   Onset of Illness/Injury or Date of Surgery - Date 02/27/17   Referring Physician Dr. Lorenzo Cope   Patient/Family Goals Statement to return home   Pertinent History of Current Problem (include personal factors and/or comorbidities that impact the POC) Hx of ankle fractures (B), has developed arthritis since then, is s/p L TAA, reports is having the other replaced later this year.   Weight-Bearing Status - LLE nonweight-bearing   Weight-Bearing Status - RLE full weight-bearing   General Info Comments cannot feel or wiggle L toes yet   Cognitive Status Examination   Orientation orientation to person, place and time   Level of Consciousness alert   Follows Commands and Answers Questions 100% of the time;able to follow multistep instructions   Personal Safety and Judgment intact   Memory intact   Integumentary/Edema   Integumentary/Edema Comments L LE cast   Strength   Strength Comments indep SLR bilateral   Bed Mobility   Bed Mobility Comments mod I supine<>sit   Transfer Skills   Transfer Comments sit<>stand SBA, used FWW, NWB L LE   Gait   Gait Comments ambulated with FWW, NWB L LE, supervision, also used rollabout supervision to indep   Sensory Examination   Sensory Perception Comments numbness L foot   General Therapy Interventions   Planned Therapy Interventions gait training;transfer training;home program  "guidelines;progressive activity/exercise   Clinical Impression   Criteria for Skilled Therapeutic Intervention yes, treatment indicated   PT Diagnosis difficulty walking   Influenced by the following impairments impaired balance   Functional limitations due to impairments impaired gait   Clinical Presentation Stable/Uncomplicated   Clinical Presentation Rationale musculoskeletal involvement   Clinical Decision Making (Complexity) Low complexity   Therapy Frequency` 2 times/day   Predicted Duration of Therapy Intervention (days/wks) 1-2 days   Anticipated Discharge Disposition Home   Risk & Benefits of therapy have been explained Yes   Patient, Family & other staff in agreement with plan of care Yes   [facility] TM \"6 Clicks\"   2016, Trustees of [facility], under license to [employer].  All rights reserved.   6 Clicks Short Forms Basic Mobility Inpatient Short Form   [facility]  \"6 Clicks\" V.2 Basic Mobility Inpatient Short Form   1. Turning from your back to your side while in a flat bed without using bedrails? 4 - None   2. Moving from lying on your back to sitting on the side of a flat bed without using bedrails? 4 - None   3. Moving to and from a bed to a chair (including a wheelchair)? 3 - A Little   4. Standing up from a chair using your arms (e.g., wheelchair, or bedside chair)? 3 - A Little   5. To walk in hospital room? 3 - A Little   6. Climbing 3-5 steps with a railing? 3 - A Little   Basic Mobility Raw Score (Score out of 24.Lower scores equate to lower levels of function) 20   Total Evaluation Time   Total Evaluation Time (Minutes) 10     "

## 2017-02-28 NOTE — PLAN OF CARE
Problem: Goal Outcome Summary  Goal: Goal Outcome Summary  Outcome: Improving  Afebrile. Denies pain; receiving oral pain medications prophylactically. Cast intact and LLE elevated on pillows. L toes with mild swelling. Sensation absent. Tolerating regular diet. Up with walker. Large void times one. Will continue to monitor and provide for needs.

## 2017-02-28 NOTE — PROGRESS NOTES
02/28/17 1403   Quick Adds   Type of Visit Initial Occupational Therapy Evaluation   Living Environment   Lives With spouse   Living Arrangements house   Home Accessibility stairs to enter home   Number of Stairs to Enter Home 2   Transportation Available car;family or friend will provide   Living Environment Comment Pt reported to be independent in mobility at baseline.  Son plans to assist spouse and Pt with stairs upon D/C.   Self-Care   Dominant Hand right   Usual Activity Tolerance good   Current Activity Tolerance fair   Regular Exercise no   Equipment Currently Used at Home crutches, axillary;walker, rolling;shower chair  (suction cup grab bar)   Activity/Exercise/Self-Care Comment Pt reported to be independent in ADLs, IADLs and working fulltime PTA.   Functional Level Prior   Ambulation 0-->independent   Transferring 0-->independent   Toileting 0-->independent   Bathing 0-->independent   Dressing 0-->independent   Eating 0-->independent   Communication 0-->understands/communicates without difficulty   Swallowing 0-->swallows foods/liquids without difficulty   Cognition 0 - no cognition issues reported   Fall history within last six months no   Which of the above functional risks had a recent onset or change? none       Present no   General Information   Onset of Illness/Injury or Date of Surgery - Date 02/27/17   Referring Physician Dr. Lorenzo Cope   Patient/Family Goals Statement Return to home.   Additional Occupational Profile Info/Pertinent History of Current Problem Pt is a 66 year old male with PMH of ankle fractures (B), has developed arthritis since then, is s/p L TAA, reports is having the other replaced later this year.   Precautions/Limitations fall precautions   Weight-Bearing Status - LLE nonweight-bearing   General Info Comments activity:  up with assist   Cognitive Status Examination   Orientation orientation to person, place and time   Level of Consciousness alert    Able to Follow Commands WNL/WFL   Personal Safety (Cognitive) WNL/WFL   Memory intact   Attention No deficits were identified   Organization/Problem Solving No deficits were identified   Executive Function No deficits were identified   Visual Perception   Visual Perception Wears glasses   Sensory Examination   Sensory Quick Adds No deficits were identified   Pain Assessment   Patient Currently in Pain Yes, see Vital Sign flowsheet   Integumentary/Edema   Integumentary/Edema no deficits were identifed   Posture   Posture not impaired   Range of Motion (ROM)   ROM Quick Adds No deficits were identified   ROM Comment B UE AROM WFL   Strength   Manual Muscle Testing Quick Adds No deficits were identified   Strength Comments B UE strength 5/5   Hand Strength   Hand Strength Comments WFL   Muscle Tone Assessment   Muscle Tone Quick Adds No deficits were identified   Coordination   Upper Extremity Coordination No deficits were identified   Mobility   Bed Mobility Comments modified I using bed rails   Transfer Skill: Bed to Chair/Chair to Bed   Level of [place]: Bed to Chair stand-by assist   Physical Assist/Nonphysical Assist: Bed to Chair verbal cues   Weight-Bearing Restrictions nonweight-bearing   Assistive Device - Transfer Skill Bed to Chair Chair to Bed Rehab Eval rolling walker   Transfer Skill: Sit to Stand   Level of [place]: Sit/Stand stand-by assist   Physical Assist/Nonphysical Assist: Sit/Stand verbal cues   Transfer Skill: Sit to Stand nonweight-bearing   Assistive Device for Transfer: Sit/Stand rolling walker   Transfer Skill: Toilet Transfer   Level of [place]: Toilet stand-by assist   Physical Assist/Nonphysical Assist: Toilet verbal cues   Weight-Bearing Restrictions: Toilet nonweight-bearing   Assistive Device rolling walker;grab bars   Upper Body Dressing   Level of [place]: Dress Upper Body independent   Physical Assist/Nonphysical Assist: Dress Upper Body set-up required   Lower  "Body Dressing   Level of [place]: Dress Lower Body moderate assist (50% patients effort)   Physical Assist/Nonphysical Assist: Dress Lower Body set-up required   Grooming   Level of [place]: Grooming stand-by assist   Physical Assist/Nonphysical Assist: Grooming set-up required  (sitting up in chair)   Eating/Self Feeding   Level of [place]: Eating independent   Physical Assist/Nonphysical Assist: Eating set-up required   Instrumental Activities of Daily Living (IADL)   Previous Responsibilities meal prep;housekeeping;shopping;yardwork;medication management;finances;driving;work   Activities of Daily Living Analysis   Impairments Contributing to Impaired Activities of Daily Living balance impaired;pain;post surgical precautions;strength decreased   General Therapy Interventions   Planned Therapy Interventions ADL retraining;transfer training   Clinical Impression   Criteria for Skilled Therapeutic Interventions Met yes, treatment indicated   OT Diagnosis decreased ADL performance   Influenced by the following impairments L TAA   Assessment of Occupational Performance 3-5 Performance Deficits   Identified Performance Deficits Pt with decreased ability to complete LE dressing, bathing, toileting, homemaking.   Clinical Decision Making (Complexity) Low complexity   Therapy Frequency other (see comments)   Predicted Duration of Therapy Intervention (days/wks) eval and 1 treat   Anticipated Discharge Disposition Home with Assist   Risks and Benefits of Treatment have been explained. Yes   Patient, Family & other staff in agreement with plan of care Yes   [facility] [employer] TM \"6 Clicks\"   2016, Trustees of [facility], under license to [employer].  All rights reserved.   6 Clicks Short Forms Daily Activity Inpatient Short Form   [facility]  \"6 Clicks\" Daily Activity Inpatient Short Form   1. Putting on and taking off regular lower body clothing? 2 - A Lot   2. Bathing (including " washing, rinsing, drying)? 2 - A Lot   3. Toileting, which includes using toilet, bedpan or urinal? 3 - A Little   4. Putting on and taking off regular upper body clothing? 4 - None   5. Taking care of personal grooming such as brushing teeth? 3 - A Little   6. Eating meals? 4 - None   Daily Activity Raw Score (Score out of 24.Lower scores equate to lower levels of function) 18   Total Evaluation Time   Total Evaluation Time (Minutes) 10

## 2017-02-28 NOTE — PLAN OF CARE
Problem: Goal Outcome Summary  Goal: Goal Outcome Summary  OT:  eval and treat complete.  Pt is a 66 year old male with PMH of ankle fractures (B), has developed arthritis since then, is s/p L TAA, reports is having the other replaced later this year.  He reported to be independent in ADLs, IADLs and mobility while living in single level house with 2 step entry.  On this date Pt alert, oriented and able to follow commands.  OT provided education in L LE NWB precautions and possible need for AE/DME.  Pt now able to complete LE dressing with min A using long handled reacher.  Bed mobility and supine<>sit completed with mod I.  Sit<>stand, bed<>chair and toilet transfers completed with SBA using FWW with cueing for proper hand placement, sequencing and precautions.   OT reviewed importance of EC/WS techniques and home safety modifications.  Pt and Wife able to verbalize good understanding.      Surgeon Discharge Plan: home later today     Current Functional Status: SBA-min A with ADLs and functional transfers     Barriers to Plan/Home: Pt has 2 step entry.     Occupational Therapy Discharge Summary     Reason for therapy discharge:    Discharged to home.     Progress towards therapy goal(s). See goals on Care Plan in [facility] electronic health record for goal details.  Goals partially met.  Barriers to achieving goals:   discharge from facility.     Therapy recommendation(s):    No further therapy is recommended.  Anticipate pt will do well with assist of Family.

## 2017-02-28 NOTE — PLAN OF CARE
Problem: Perioperative Period (Adult)  Goal: Signs and Symptoms of Listed Potential Problems Will be Absent or Manageable (Perioperative Period)  Signs and symptoms of listed potential problems will be absent or manageable by discharge/transition of care (reference Perioperative Period (Adult) CPG).   Outcome: Improving  VSS on RA. Pt rested in bed this shift, repositioning self. LLE elevated on pillows, color intact; denies sensation to toes, unable to wiggle. ALEISHA LLE pedal pulse, incision d/t hard cast. On clear liquids overnight and tolerating well, advanced to fulls with good tolerance, currently on regular diet, continue to monitor. PIV infusing overnight, SL this AM with fluids encouraged. Iv abx given as scheduled.  BS+, pt reports passing gas overnight. [place] in place with good UO this shift. LS clear, performs IS with encouragement. Alert and oriented, able to make needs known. Continue to monitor.

## 2017-02-28 NOTE — PLAN OF CARE
Problem: Goal Outcome Summary  Goal: Goal Outcome Summary  PT: pt and wife feel questions have been answered, pt is indep with use of rollabout knee scooter, pt declines to try stairs again, went over options for doing stairs (was able to practice them this AM). Pt awaiting discharge.     Physical Therapy Discharge Summary     Reason for therapy discharge:    Discharged to home.     Progress towards therapy goal(s). See goals on Care Plan in [facility] electronic health record for goal details.  Goals met     Therapy recommendation(s):    keep foot elevated above his heart, NWB

## 2017-02-28 NOTE — PHARMACY-ADMISSION MEDICATION HISTORY
Medication reconciliation interview completed by pre-admitting nurse Melia Alcala, reviewed by pharmacy. No further clarifications needed.       Prior to Admission medications    Not on File

## 2017-03-19 NOTE — DISCHARGE SUMMARY
ORTHOPEDICS DISCHARGE SUMMARY      DATE OF ADMISSION:  2017      DATE OF DISCHARGE:  2017      ADMISSION DIAGNOSES:   1.  Left ankle arthritis.   2.  Left subtalar joint arthritis.   3.  Varus deformity, left ankle.   4.  Achilles tendon contracture, left.      PROCEDURES PERFORMED THIS ADMISSION:   1.  Left total ankle arthroplasty.   2.  Left subtalar joint arthrodesis.   3.  Tendo-Achilles lengthening, left.      INDICATIONS FOR ADMISSION:  Donny Rodrigez is a 66-year-old man who presented for elective procedures as listed above.      HOSPITAL COURSE:  The patient underwent the above-listed procedures on the day of admission without complication.  He was admitted postoperatively for extended recovery and pain control.  On postoperative day #1, he was doing well and was cleared for discharge home.  Please see the chart for further details.         MONET GALLARDO MD             D: 2017 09:57   T: 2017 10:57   MT: [id number]      Name:     DONNY RODRIGEZ   MRN:      [id number]        Account:        [id number]   :      1950           Admit Date:                                       Discharge Date: 2017      Document: [id number]       cc: Tommy Ward MD

## 2017-11-07 RX ORDER — TADALAFIL 5 MG/1
5 TABLET ORAL DAILY PRN
COMMUNITY

## 2017-11-09 RX ORDER — ACETAMINOPHEN 325 MG/1
325-650 TABLET ORAL EVERY 6 HOURS PRN
COMMUNITY
End: 2021-03-11

## 2017-11-09 NOTE — PHARMACY-ADMISSION MEDICATION HISTORY
Medication reconciliation interview completed by pre-admitting nurse Isha Leon, reviewed by pharmacy. No further clarifications needed.       Prior to Admission medications    Medication Sig Last Dose Taking? Auth Provider   acetaminophen (TYLENOL) 325 MG tablet Take 325-650 mg by mouth every 6 hours as needed for mild pain  Yes Unknown, Entered By History   Tadalafil (CIALIS PO) Take 5 mg by mouth daily as needed   Yes Reported, Patient   ibuprofen (ADVIL/MOTRIN) 600 MG tablet Take 1 tablet (600 mg) by mouth every 6 hours as needed for moderate pain  Yes Aminata Walker PA-C

## 2017-11-13 ENCOUNTER — HOSPITAL ENCOUNTER (INPATIENT)
Facility: CLINIC | Age: 67
LOS: 1 days | Discharge: HOME | DRG: 469 | End: 2017-11-14
Attending: ORTHOPAEDIC SURGERY | Admitting: ORTHOPAEDIC SURGERY
Payer: MEDICARE

## 2017-11-13 ENCOUNTER — ANESTHESIA (OUTPATIENT)
Dept: SURGERY | Facility: CLINIC | Age: 67
DRG: 469 | End: 2017-11-13
Payer: MEDICARE

## 2017-11-13 ENCOUNTER — ANESTHESIA EVENT (OUTPATIENT)
Dept: SURGERY | Facility: CLINIC | Age: 67
DRG: 469 | End: 2017-11-13
Payer: MEDICARE

## 2017-11-13 ENCOUNTER — APPOINTMENT (OUTPATIENT)
Dept: GENERAL RADIOLOGY | Facility: CLINIC | Age: 67
DRG: 469 | End: 2017-11-13
Attending: ORTHOPAEDIC SURGERY
Payer: MEDICARE

## 2017-11-13 DIAGNOSIS — M19.171 POST-TRAUMATIC ARTHRITIS OF ANKLE, RIGHT: Primary | ICD-10-CM

## 2017-11-13 PROBLEM — M19.072 ARTHRITIS OF ANKLE, LEFT: Status: ACTIVE | Noted: 2017-11-13

## 2017-11-13 PROCEDURE — C1713 ANCHOR/SCREW BN/BN,TIS/BN: HCPCS | Performed by: ORTHOPAEDIC SURGERY

## 2017-11-13 PROCEDURE — 25000128 H RX IP 250 OP 636: Performed by: PHYSICIAN ASSISTANT

## 2017-11-13 PROCEDURE — 27211024 ZZHC OR SUPPLY OTHER OPNP: Performed by: ORTHOPAEDIC SURGERY

## 2017-11-13 PROCEDURE — 40000171 ZZH STATISTIC PRE-PROCEDURE ASSESSMENT III: Performed by: ORTHOPAEDIC SURGERY

## 2017-11-13 PROCEDURE — 36000065 ZZH SURGERY LEVEL 4 W FLUORO 1ST 30 MIN: Performed by: ORTHOPAEDIC SURGERY

## 2017-11-13 PROCEDURE — 25000128 H RX IP 250 OP 636: Performed by: NURSE ANESTHETIST, CERTIFIED REGISTERED

## 2017-11-13 PROCEDURE — 37000008 ZZH ANESTHESIA TECHNICAL FEE, 1ST 30 MIN: Performed by: ORTHOPAEDIC SURGERY

## 2017-11-13 PROCEDURE — 25000132 ZZH RX MED GY IP 250 OP 250 PS 637: Mod: GY | Performed by: ORTHOPAEDIC SURGERY

## 2017-11-13 PROCEDURE — 0L8N0ZZ DIVISION OF RIGHT LOWER LEG TENDON, OPEN APPROACH: ICD-10-PCS | Performed by: ORTHOPAEDIC SURGERY

## 2017-11-13 PROCEDURE — 0SRF0JA REPLACEMENT OF RIGHT ANKLE JOINT WITH SYNTHETIC SUBSTITUTE, UNCEMENTED, OPEN APPROACH: ICD-10-PCS | Performed by: ORTHOPAEDIC SURGERY

## 2017-11-13 PROCEDURE — 37000009 ZZH ANESTHESIA TECHNICAL FEE, EACH ADDTL 15 MIN: Performed by: ORTHOPAEDIC SURGERY

## 2017-11-13 PROCEDURE — 27210794 ZZH OR GENERAL SUPPLY STERILE: Performed by: ORTHOPAEDIC SURGERY

## 2017-11-13 PROCEDURE — 25000128 H RX IP 250 OP 636: Performed by: ORTHOPAEDIC SURGERY

## 2017-11-13 PROCEDURE — 71000013 ZZH RECOVERY PHASE 1 LEVEL 1 EA ADDTL HR: Performed by: ORTHOPAEDIC SURGERY

## 2017-11-13 PROCEDURE — 40000277 XR SURGERY CARM FLUORO LESS THAN 5 MIN W STILLS: Mod: TC

## 2017-11-13 PROCEDURE — 25000125 ZZHC RX 250: Performed by: NURSE ANESTHETIST, CERTIFIED REGISTERED

## 2017-11-13 PROCEDURE — 36000063 ZZH SURGERY LEVEL 4 EA 15 ADDTL MIN: Performed by: ORTHOPAEDIC SURGERY

## 2017-11-13 PROCEDURE — 25000566 ZZH SEVOFLURANE, EA 15 MIN: Performed by: ORTHOPAEDIC SURGERY

## 2017-11-13 PROCEDURE — 27210995 ZZH RX 272: Performed by: ORTHOPAEDIC SURGERY

## 2017-11-13 PROCEDURE — 12000007 ZZH R&B INTERMEDIATE

## 2017-11-13 PROCEDURE — 25000128 H RX IP 250 OP 636: Performed by: ANESTHESIOLOGY

## 2017-11-13 PROCEDURE — 0MQQ0ZZ REPAIR RIGHT ANKLE BURSA AND LIGAMENT, OPEN APPROACH: ICD-10-PCS | Performed by: ORTHOPAEDIC SURGERY

## 2017-11-13 PROCEDURE — 71000012 ZZH RECOVERY PHASE 1 LEVEL 1 FIRST HR: Performed by: ORTHOPAEDIC SURGERY

## 2017-11-13 PROCEDURE — A9270 NON-COVERED ITEM OR SERVICE: HCPCS | Mod: GY | Performed by: ORTHOPAEDIC SURGERY

## 2017-11-13 DEVICE — IMPLANTABLE DEVICE: Type: IMPLANTABLE DEVICE | Site: ANKLE | Status: FUNCTIONAL

## 2017-11-13 DEVICE — IMP KIT REPAIR LIGAMENT AUGMENTATION INT BRACE AR-1688-CP: Type: IMPLANTABLE DEVICE | Site: ANKLE | Status: FUNCTIONAL

## 2017-11-13 RX ORDER — GABAPENTIN 300 MG/1
300 CAPSULE ORAL 3 TIMES DAILY
Status: DISCONTINUED | OUTPATIENT
Start: 2017-11-13 | End: 2017-11-14 | Stop reason: HOSPADM

## 2017-11-13 RX ORDER — ACETAMINOPHEN 325 MG/1
975 TABLET ORAL EVERY 8 HOURS
Status: DISCONTINUED | OUTPATIENT
Start: 2017-11-13 | End: 2017-11-14 | Stop reason: HOSPADM

## 2017-11-13 RX ORDER — KETOROLAC TROMETHAMINE 30 MG/ML
15 INJECTION, SOLUTION INTRAMUSCULAR; INTRAVENOUS
Status: DISCONTINUED | OUTPATIENT
Start: 2017-11-13 | End: 2017-11-13 | Stop reason: HOSPADM

## 2017-11-13 RX ORDER — MAGNESIUM HYDROXIDE 1200 MG/15ML
LIQUID ORAL PRN
Status: DISCONTINUED | OUTPATIENT
Start: 2017-11-13 | End: 2017-11-13 | Stop reason: HOSPADM

## 2017-11-13 RX ORDER — ONDANSETRON 2 MG/ML
INJECTION INTRAMUSCULAR; INTRAVENOUS PRN
Status: DISCONTINUED | OUTPATIENT
Start: 2017-11-13 | End: 2017-11-13

## 2017-11-13 RX ORDER — DROPERIDOL 2.5 MG/ML
0.62 INJECTION, SOLUTION INTRAMUSCULAR; INTRAVENOUS
Status: DISCONTINUED | OUTPATIENT
Start: 2017-11-13 | End: 2017-11-13

## 2017-11-13 RX ORDER — METOCLOPRAMIDE HYDROCHLORIDE 5 MG/ML
5 INJECTION INTRAMUSCULAR; INTRAVENOUS EVERY 6 HOURS PRN
Status: DISCONTINUED | OUTPATIENT
Start: 2017-11-13 | End: 2017-11-13 | Stop reason: HOSPADM

## 2017-11-13 RX ORDER — PROCHLORPERAZINE MALEATE 5 MG
5 TABLET ORAL EVERY 6 HOURS PRN
Status: DISCONTINUED | OUTPATIENT
Start: 2017-11-13 | End: 2017-11-14 | Stop reason: HOSPADM

## 2017-11-13 RX ORDER — ONDANSETRON 2 MG/ML
4 INJECTION INTRAMUSCULAR; INTRAVENOUS EVERY 6 HOURS PRN
Status: DISCONTINUED | OUTPATIENT
Start: 2017-11-13 | End: 2017-11-14 | Stop reason: HOSPADM

## 2017-11-13 RX ORDER — IBUPROFEN 200 MG
200-600 TABLET ORAL EVERY 6 HOURS PRN
Status: DISCONTINUED | OUTPATIENT
Start: 2017-11-13 | End: 2017-11-14 | Stop reason: HOSPADM

## 2017-11-13 RX ORDER — LIDOCAINE HYDROCHLORIDE 10 MG/ML
INJECTION, SOLUTION INFILTRATION; PERINEURAL PRN
Status: DISCONTINUED | OUTPATIENT
Start: 2017-11-13 | End: 2017-11-13

## 2017-11-13 RX ORDER — CEFAZOLIN SODIUM 2 G/100ML
2 INJECTION, SOLUTION INTRAVENOUS EVERY 8 HOURS
Status: COMPLETED | OUTPATIENT
Start: 2017-11-13 | End: 2017-11-14

## 2017-11-13 RX ORDER — DEXAMETHASONE SODIUM PHOSPHATE 4 MG/ML
INJECTION, SOLUTION INTRA-ARTICULAR; INTRALESIONAL; INTRAMUSCULAR; INTRAVENOUS; SOFT TISSUE PRN
Status: DISCONTINUED | OUTPATIENT
Start: 2017-11-13 | End: 2017-11-13

## 2017-11-13 RX ORDER — ONDANSETRON 4 MG/1
4 TABLET, ORALLY DISINTEGRATING ORAL EVERY 30 MIN PRN
Status: DISCONTINUED | OUTPATIENT
Start: 2017-11-13 | End: 2017-11-13 | Stop reason: HOSPADM

## 2017-11-13 RX ORDER — METOCLOPRAMIDE 5 MG/1
5 TABLET ORAL EVERY 6 HOURS PRN
Status: DISCONTINUED | OUTPATIENT
Start: 2017-11-13 | End: 2017-11-13 | Stop reason: HOSPADM

## 2017-11-13 RX ORDER — DIMENHYDRINATE 50 MG/ML
25 INJECTION, SOLUTION INTRAMUSCULAR; INTRAVENOUS
Status: DISCONTINUED | OUTPATIENT
Start: 2017-11-13 | End: 2017-11-13 | Stop reason: HOSPADM

## 2017-11-13 RX ORDER — EPHEDRINE SULFATE 50 MG/ML
INJECTION, SOLUTION INTRAMUSCULAR; INTRAVENOUS; SUBCUTANEOUS PRN
Status: DISCONTINUED | OUTPATIENT
Start: 2017-11-13 | End: 2017-11-13

## 2017-11-13 RX ORDER — HYDROMORPHONE HCL/0.9% NACL/PF 0.2MG/0.2
.2-.4 SYRINGE (ML) INTRAVENOUS
Status: DISCONTINUED | OUTPATIENT
Start: 2017-11-13 | End: 2017-11-14 | Stop reason: HOSPADM

## 2017-11-13 RX ORDER — FENTANYL CITRATE 50 UG/ML
INJECTION, SOLUTION INTRAMUSCULAR; INTRAVENOUS PRN
Status: DISCONTINUED | OUTPATIENT
Start: 2017-11-13 | End: 2017-11-13

## 2017-11-13 RX ORDER — AMOXICILLIN 250 MG
1-2 CAPSULE ORAL 2 TIMES DAILY
Status: DISCONTINUED | OUTPATIENT
Start: 2017-11-13 | End: 2017-11-14 | Stop reason: HOSPADM

## 2017-11-13 RX ORDER — MEPERIDINE HYDROCHLORIDE 25 MG/ML
12.5 INJECTION INTRAMUSCULAR; INTRAVENOUS; SUBCUTANEOUS EVERY 5 MIN PRN
Status: DISCONTINUED | OUTPATIENT
Start: 2017-11-13 | End: 2017-11-13 | Stop reason: HOSPADM

## 2017-11-13 RX ORDER — CYCLOBENZAPRINE HCL 5 MG
5 TABLET ORAL 3 TIMES DAILY PRN
Status: DISCONTINUED | OUTPATIENT
Start: 2017-11-13 | End: 2017-11-14 | Stop reason: HOSPADM

## 2017-11-13 RX ORDER — DEXAMETHASONE SODIUM PHOSPHATE 4 MG/ML
4 INJECTION, SOLUTION INTRA-ARTICULAR; INTRALESIONAL; INTRAMUSCULAR; INTRAVENOUS; SOFT TISSUE
Status: DISCONTINUED | OUTPATIENT
Start: 2017-11-13 | End: 2017-11-13 | Stop reason: HOSPADM

## 2017-11-13 RX ORDER — LABETALOL HYDROCHLORIDE 5 MG/ML
10 INJECTION, SOLUTION INTRAVENOUS
Status: DISCONTINUED | OUTPATIENT
Start: 2017-11-13 | End: 2017-11-13 | Stop reason: HOSPADM

## 2017-11-13 RX ORDER — PROPOFOL 10 MG/ML
INJECTION, EMULSION INTRAVENOUS PRN
Status: DISCONTINUED | OUTPATIENT
Start: 2017-11-13 | End: 2017-11-13

## 2017-11-13 RX ORDER — CEFAZOLIN SODIUM 1 G/3ML
1 INJECTION, POWDER, FOR SOLUTION INTRAMUSCULAR; INTRAVENOUS SEE ADMIN INSTRUCTIONS
Status: DISCONTINUED | OUTPATIENT
Start: 2017-11-13 | End: 2017-11-13 | Stop reason: HOSPADM

## 2017-11-13 RX ORDER — ONDANSETRON 2 MG/ML
4 INJECTION INTRAMUSCULAR; INTRAVENOUS EVERY 30 MIN PRN
Status: DISCONTINUED | OUTPATIENT
Start: 2017-11-13 | End: 2017-11-13 | Stop reason: HOSPADM

## 2017-11-13 RX ORDER — ACETAMINOPHEN 325 MG/1
650 TABLET ORAL EVERY 4 HOURS PRN
Status: DISCONTINUED | OUTPATIENT
Start: 2017-11-16 | End: 2017-11-14 | Stop reason: HOSPADM

## 2017-11-13 RX ORDER — LIDOCAINE 40 MG/G
CREAM TOPICAL
Status: DISCONTINUED | OUTPATIENT
Start: 2017-11-13 | End: 2017-11-13 | Stop reason: HOSPADM

## 2017-11-13 RX ORDER — SODIUM CHLORIDE, SODIUM LACTATE, POTASSIUM CHLORIDE, CALCIUM CHLORIDE 600; 310; 30; 20 MG/100ML; MG/100ML; MG/100ML; MG/100ML
INJECTION, SOLUTION INTRAVENOUS CONTINUOUS
Status: DISCONTINUED | OUTPATIENT
Start: 2017-11-13 | End: 2017-11-13 | Stop reason: HOSPADM

## 2017-11-13 RX ORDER — OXYCODONE HYDROCHLORIDE 5 MG/1
5-10 TABLET ORAL EVERY 4 HOURS PRN
Status: DISCONTINUED | OUTPATIENT
Start: 2017-11-13 | End: 2017-11-14 | Stop reason: HOSPADM

## 2017-11-13 RX ORDER — NALOXONE HYDROCHLORIDE 0.4 MG/ML
.1-.4 INJECTION, SOLUTION INTRAMUSCULAR; INTRAVENOUS; SUBCUTANEOUS
Status: DISCONTINUED | OUTPATIENT
Start: 2017-11-13 | End: 2017-11-14 | Stop reason: HOSPADM

## 2017-11-13 RX ORDER — HYDRALAZINE HYDROCHLORIDE 20 MG/ML
2.5-5 INJECTION INTRAMUSCULAR; INTRAVENOUS EVERY 10 MIN PRN
Status: DISCONTINUED | OUTPATIENT
Start: 2017-11-13 | End: 2017-11-13 | Stop reason: HOSPADM

## 2017-11-13 RX ORDER — CEFAZOLIN SODIUM 2 G/100ML
2 INJECTION, SOLUTION INTRAVENOUS
Status: COMPLETED | OUTPATIENT
Start: 2017-11-13 | End: 2017-11-13

## 2017-11-13 RX ORDER — SODIUM CHLORIDE, SODIUM LACTATE, POTASSIUM CHLORIDE, CALCIUM CHLORIDE 600; 310; 30; 20 MG/100ML; MG/100ML; MG/100ML; MG/100ML
INJECTION, SOLUTION INTRAVENOUS CONTINUOUS
Status: DISCONTINUED | OUTPATIENT
Start: 2017-11-13 | End: 2017-11-14 | Stop reason: HOSPADM

## 2017-11-13 RX ORDER — LIDOCAINE 40 MG/G
CREAM TOPICAL
Status: DISCONTINUED | OUTPATIENT
Start: 2017-11-13 | End: 2017-11-14 | Stop reason: HOSPADM

## 2017-11-13 RX ORDER — FENTANYL CITRATE 50 UG/ML
25-50 INJECTION, SOLUTION INTRAMUSCULAR; INTRAVENOUS
Status: DISCONTINUED | OUTPATIENT
Start: 2017-11-13 | End: 2017-11-13 | Stop reason: HOSPADM

## 2017-11-13 RX ORDER — ONDANSETRON 4 MG/1
4 TABLET, ORALLY DISINTEGRATING ORAL EVERY 6 HOURS PRN
Status: DISCONTINUED | OUTPATIENT
Start: 2017-11-13 | End: 2017-11-14 | Stop reason: HOSPADM

## 2017-11-13 RX ORDER — GLYCOPYRROLATE 0.2 MG/ML
INJECTION, SOLUTION INTRAMUSCULAR; INTRAVENOUS PRN
Status: DISCONTINUED | OUTPATIENT
Start: 2017-11-13 | End: 2017-11-13

## 2017-11-13 RX ORDER — HYDROXYZINE HYDROCHLORIDE 10 MG/1
10 TABLET, FILM COATED ORAL EVERY 6 HOURS PRN
Status: DISCONTINUED | OUTPATIENT
Start: 2017-11-13 | End: 2017-11-14 | Stop reason: HOSPADM

## 2017-11-13 RX ADMIN — SODIUM CHLORIDE, POTASSIUM CHLORIDE, SODIUM LACTATE AND CALCIUM CHLORIDE: 600; 310; 30; 20 INJECTION, SOLUTION INTRAVENOUS at 12:18

## 2017-11-13 RX ADMIN — GLYCOPYRROLATE 0.2 MG: 0.2 INJECTION, SOLUTION INTRAMUSCULAR; INTRAVENOUS at 12:15

## 2017-11-13 RX ADMIN — OXYCODONE HYDROCHLORIDE 5 MG: 5 TABLET ORAL at 15:37

## 2017-11-13 RX ADMIN — ACETAMINOPHEN 975 MG: 325 TABLET, FILM COATED ORAL at 18:17

## 2017-11-13 RX ADMIN — IBUPROFEN 400 MG: 200 TABLET, FILM COATED ORAL at 18:45

## 2017-11-13 RX ADMIN — LIDOCAINE HYDROCHLORIDE 50 MG: 10 INJECTION, SOLUTION INFILTRATION; PERINEURAL at 12:15

## 2017-11-13 RX ADMIN — Medication 10 MG: at 12:29

## 2017-11-13 RX ADMIN — GABAPENTIN 300 MG: 300 CAPSULE ORAL at 19:49

## 2017-11-13 RX ADMIN — Medication 5 MG: at 12:26

## 2017-11-13 RX ADMIN — FENTANYL CITRATE 50 MCG: 50 INJECTION INTRAMUSCULAR; INTRAVENOUS at 16:33

## 2017-11-13 RX ADMIN — CEFAZOLIN SODIUM 2 G: 2 INJECTION, SOLUTION INTRAVENOUS at 19:51

## 2017-11-13 RX ADMIN — OXYCODONE HYDROCHLORIDE 10 MG: 5 TABLET ORAL at 19:49

## 2017-11-13 RX ADMIN — SODIUM CHLORIDE, POTASSIUM CHLORIDE, SODIUM LACTATE AND CALCIUM CHLORIDE: 600; 310; 30; 20 INJECTION, SOLUTION INTRAVENOUS at 17:55

## 2017-11-13 RX ADMIN — CEFAZOLIN SODIUM 2 G: 2 INJECTION, SOLUTION INTRAVENOUS at 12:04

## 2017-11-13 RX ADMIN — CEFAZOLIN 1 G: 1 INJECTION, POWDER, FOR SOLUTION INTRAMUSCULAR; INTRAVENOUS at 14:12

## 2017-11-13 RX ADMIN — ONDANSETRON 4 MG: 2 INJECTION INTRAMUSCULAR; INTRAVENOUS at 13:11

## 2017-11-13 RX ADMIN — FENTANYL CITRATE 50 MCG: 50 INJECTION INTRAMUSCULAR; INTRAVENOUS at 15:04

## 2017-11-13 RX ADMIN — FENTANYL CITRATE 50 MCG: 50 INJECTION INTRAMUSCULAR; INTRAVENOUS at 16:09

## 2017-11-13 RX ADMIN — SODIUM CHLORIDE, POTASSIUM CHLORIDE, SODIUM LACTATE AND CALCIUM CHLORIDE: 600; 310; 30; 20 INJECTION, SOLUTION INTRAVENOUS at 13:11

## 2017-11-13 RX ADMIN — FENTANYL CITRATE 50 MCG: 50 INJECTION INTRAMUSCULAR; INTRAVENOUS at 15:10

## 2017-11-13 RX ADMIN — PROPOFOL 200 MG: 10 INJECTION, EMULSION INTRAVENOUS at 12:15

## 2017-11-13 RX ADMIN — SODIUM CHLORIDE, POTASSIUM CHLORIDE, SODIUM LACTATE AND CALCIUM CHLORIDE: 600; 310; 30; 20 INJECTION, SOLUTION INTRAVENOUS at 11:20

## 2017-11-13 RX ADMIN — FENTANYL CITRATE 150 MCG: 50 INJECTION, SOLUTION INTRAMUSCULAR; INTRAVENOUS at 12:15

## 2017-11-13 RX ADMIN — DEXAMETHASONE SODIUM PHOSPHATE 4 MG: 4 INJECTION, SOLUTION INTRA-ARTICULAR; INTRALESIONAL; INTRAMUSCULAR; INTRAVENOUS; SOFT TISSUE at 12:15

## 2017-11-13 RX ADMIN — SENNOSIDES AND DOCUSATE SODIUM 1 TABLET: 8.6; 5 TABLET ORAL at 19:49

## 2017-11-13 RX ADMIN — FENTANYL CITRATE 100 MCG: 50 INJECTION, SOLUTION INTRAMUSCULAR; INTRAVENOUS at 12:22

## 2017-11-13 NOTE — ANESTHESIA CARE TRANSFER NOTE
Patient: Donny Cleveland    Procedure(s):  right total ankle arthroplasty, gastrocnemius recession, hardware removal    surgeon request choice anesthesia - Wound Class: I-Clean   - Wound Class: I-Clean      Diagnosis: ankle degenerative joint disease, gastroc contracture  Diagnosis Additional Information: No value filed.    Anesthesia Type:   General     Note:  Airway :Face Mask  Patient transferred to:PACU  Comments: Reyna Report: Identifed the Patient, Identified the Reponsible Provider, Reviewed the pertinent medical history, Discussed the surgical course, Reviewed Intra-OP anesthesia mangement and issues during anesthesia, Set expectations for post-procedure period and Allowed opportunity for questions and acknowledgement of understanding      Vitals: (Last set prior to Anesthesia Care Transfer)    CRNA VITALS  11/13/2017 1406 - 11/13/2017 1441      11/13/2017             Pulse: 106    SpO2: 97 %    Resp Rate (observed): (!)  7                Electronically Signed By: SHARON Read CRNA  November 13, 2017  2:41 PM

## 2017-11-13 NOTE — IP AVS SNAPSHOT
MRN:[id number]                      After Visit Summary   11/13/2017    Donny Cleveland    MRN: [id number]           Thank you!     Thank you for choosing [facility] for your care. Our goal is always to provide you with excellent care. Hearing back from our patients is one way we can continue to improve our services. Please take a few minutes to complete the written survey that you may receive in the mail after you visit. If you would like to speak to someone directly about your visit please contact Patient Relations at [phone]. Thank you!          Patient Information     Date Of Birth          1950        Designated Caregiver       Most Recent Value    Caregiver    Will someone help with your care after discharge? yes    Name of designated caregiver Amanda    Phone number of caregiver [phone]    Caregiver address same as pt      About your hospital stay     You were admitted on:  November 13, 2017 You last received care in the:  [facility]    You were discharged on:  November 14, 2017        Reason for your hospital stay       Donny Cleveland was admitted to the floor following a right total ankle arthroplasty, gastroc recession and lateral ligament reconstruction.  He was seen by PT/OT and transitioned to oral pain medication.  Please see chart for full details.                  Who to Call     For medical emergencies, please call 911.  For non-urgent questions about your medical care, please call your primary care provider or clinic, [phone]  For questions related to your surgery, please call your surgery clinic        Attending Provider     Provider Lorenzo Choudhary MD Orthopaedic Surgery       Primary Care Provider Office Phone # Fax #    Tommy Piedra [phone]       When to contact your care team       Call Dr Cope's office [phone] if you have any of the following: temperature greater than  100, increased pain in leg, increased shortness of breath or cast concerns                  After Care Instructions     Activity       Your activity upon discharge: bedrest, elevate right leg at heart level for edema control            Diet       Follow this diet upon discharge: Orders Placed This Encounter      Advance Diet as Tolerated: Regular Diet Adult  Get plenty of fiber and water while on narcotic pain medication            Wound care and dressings       Instructions to care for your wound at home: keep cast clean and dry.                  Follow-up Appointments     Follow-up and recommended labs and tests        Follow up with Dr. Cope's office, at [address] [place] [address], within 2 weeks  to evaluate after surgery. No follow up labs or test are needed.                  Further instructions from your care team       TAKE HOME INSTRUCTIONS FOLLOWING FRACTURED ANKLE SURGERY  Your surgeon will answer any questions about your progress. General guidelines for your care are listed below. Your surgeon may give additional instructions for your care at home. Please follow them carefully.    Activity Level  1. Physical activity may be resumed gradually according to your comfort level and your surgeon s instructions.  2. Increase your time and distance as you gain strength. Use your walker/crutches and limit the amount of weight you place on your fractured ankle, as instructed by your surgeon. Your surgeon will determine (in the follow-up office visits) when these restrictions no longer apply.  3. Allow yourself rest periods during the day. As you regain strength, you will need  fewer rest periods. It is recommended that your ankle be elevated when lying or sitting, to reduce swelling and pain.    Good Health Practices  1. Maintain an adequate fluid intake and eat a well balanced diet.  2. Be sure to include the basic food groups, such as dairy products, meat/fish,  vegetables, and fruit. Each of  "these foods contribute to your wound healing and increasing your strength.  3. Surgery, decreased activity and pain medication all contribute to a decrease in bowel activity that can result in constipation. It is recommended that you increase your liquid intake, and fiber to your diet, increase activity, and decrease pain medication use. If you have any problems, notify your physician.    Incision/Dressing Care  Keep cast/splint clean and dry. Do not stick items between cast and leg as this can cause skin or incision damage.    Things to Watch For  Notify your surgeon if you notice any of the followin. Any significant drainage on your cast/splint  2. Increased swelling or tightness of the cast/splint  3. Increased pain, numbness or tingling of toes or foot  4. Fever above 101   5. Calf pain  6. Any other questions or concerns related to your surgery/recovery.      Thank you for allowing [facility] to participate in your cares!!         Pending Results     No orders found for last 3 day(s).            Statement of Approval     Ordered          17 0947  I have reviewed and agree with all the recommendations and orders detailed in this document.  EFFECTIVE NOW     Approved and electronically signed by:  Aminata Walker PA-C             Admission Information     Date & Time Provider Department Dept. Phone    2017 Lorenzo Cope MD [facility] [phone]      Your Vitals Were     Blood Pressure Pulse Temperature Respirations Height Weight    145/78 76 97.5  F (36.4  C) (Oral) 18 1.829 m (6') 114.3 kg (252 lb)    Pulse Oximetry BMI (Body Mass Index)                93% 34.18 kg/m2          MyChart Information     [employer] lets you send messages to your doctor, view your test results, renew your prescriptions, schedule appointments and more. To sign up, go to [email or web address] . Click on \"Log in\" on the left side of the screen, which will take " "you to the Welcome page. Then click on \"Sign up Now\" on the right side of the page.     You will be asked to enter the access code listed below, as well as some personal information. Please follow the directions to create your username and password.     Your access code is: [email or web address]  Expires: 2018  7:24 AM     Your access code will  in 90 days. If you need help or a new code, please call your [facility] clinic or [phone].        Care EveryWhere ID     This is your Care EveryWhere ID. This could be used by other organizations to access your [facility] medical records  [id number]        Equal Access to Services     KAYLA SCHILLING : Rachel Burt, radha sauer, chapo daniel, alessandro rodas. So [facility] [phone].    ATENCIÓN: Si habla español, tiene a yin disposición servicios gratuitos de asistencia lingüística. Llame al [phone].    We comply with applicable federal civil rights laws and [place] laws. We do not discriminate on the basis of race, color, national origin, age, disability, sex, sexual orientation, or gender identity.               Review of your medicines      START taking        Dose / Directions    gabapentin 300 MG capsule   Commonly known as:  NEURONTIN   Used for:  Post-traumatic arthritis of ankle, right        Dose:  300 mg   Take 1 capsule (300 mg) by mouth 3 times daily   Quantity:  9 capsule   Refills:  0       oxyCODONE IR 5 MG tablet   Commonly known as:  ROXICODONE   Used for:  Post-traumatic arthritis of ankle, right        Dose:  5-10 mg   Take 1-2 tablets (5-10 mg) by mouth every 4 hours as needed for moderate to severe pain   Quantity:  24 tablet   Refills:  0       senna-docusate 8.6-50 MG per tablet   Commonly known as:  SENOKOT-S;PERICOLACE   Used for:  Post-traumatic arthritis of ankle, right        Dose:  1-2 tablet   Take 1-2 tablets by mouth 2 times daily   Quantity:  40 tablet   Refills:  0       "   CONTINUE these medicines which have NOT CHANGED        Dose / Directions    acetaminophen 325 MG tablet   Commonly known as:  TYLENOL        Dose:  325-650 mg   Take 325-650 mg by mouth every 6 hours as needed for mild pain   Refills:  0       CIALIS PO   Notes to Patient:  Resume per home schedule        Dose:  5 mg   Take 5 mg by mouth daily as needed   Refills:  0       ibuprofen 600 MG tablet   Commonly known as:  ADVIL/MOTRIN   Used for:  Post-traumatic arthritis of ankle, left        Dose:  600 mg   Take 1 tablet (600 mg) by mouth every 6 hours as needed for moderate pain   Quantity:  50 tablet   Refills:  1            Where to get your medicines      Some of these will need a paper prescription and others can be bought over the counter. Ask your nurse if you have questions.     Bring a paper prescription for each of these medications     gabapentin 300 MG capsule    oxyCODONE IR 5 MG tablet    senna-docusate 8.6-50 MG per tablet                Protect others around you: Learn how to safely use, store and throw away your medicines at www.disposemymeds.org.             Medication List: This is a list of all your medications and when to take them. Check marks below indicate your daily home schedule. Keep this list as a reference.      Medications           Morning Afternoon Evening Bedtime As Needed    acetaminophen 325 MG tablet   Commonly known as:  TYLENOL   Take 325-650 mg by mouth every 6 hours as needed for mild pain   Last time this was given:  975 mg on 11/14/2017 10:44 AM   Next Dose Due:  Next available today at 7 PM  Do NOT take more than 4,000 mg daily  Alternate Tylenol and Ibuprofen                                    CIALIS PO   Take 5 mg by mouth daily as needed   Notes to Patient:  Resume per home schedule                                gabapentin 300 MG capsule   Commonly known as:  NEURONTIN   Take 1 capsule (300 mg) by mouth 3 times daily   Last time this was given:  300 mg on 11/14/2017   8:10 AM   Next Dose Due:  8 AM, 2 PM, 8 PM  2 PM today                                         ibuprofen 600 MG tablet   Commonly known as:  ADVIL/MOTRIN   Take 1 tablet (600 mg) by mouth every 6 hours as needed for moderate pain   Last time this was given:  400 mg on 11/13/2017  6:45 PM   Next Dose Due:  Available today at 2 PM                                   oxyCODONE IR 5 MG tablet   Commonly known as:  ROXICODONE   Take 1-2 tablets (5-10 mg) by mouth every 4 hours as needed for moderate to severe pain   Last time this was given:  10 mg on 11/14/2017  8:10 AM   Next Dose Due:  Next available today at 12 PM                                   senna-docusate 8.6-50 MG per tablet   Commonly known as:  SENOKOT-S;PERICOLACE   Take 1-2 tablets by mouth 2 times daily   Last time this was given:  1 tablet on 11/14/2017  8:10 AM   Next Dose Due:  Tonight                                                More Information        Constipation (Adult)  Constipation means that you have bowel movements that are less frequent than usual. Stools often become very hard and difficult to pass.  Constipation is very common. At some point in life it affects almost everyone. Since everyone's bowel habits are different, what is constipation to one person may not be to another. Your healthcare provider may do tests to diagnose constipation. It depends on what he or she finds when evaluating you.    Symptoms of constipation include:    Abdominal pain    Bloating    Vomiting    Painful bowel movements    Itching, swelling, bleeding, or pain around the anus  Causes  Constipation can have many causes. These include:    Diet low in fiber    Too much dairy    Not drinking enough liquids    Lack of exercise or physical activity. This is especially true for older adults.    Changes in lifestyle or daily routine, including pregnancy, aging, work, and travel    Frequent use or misuse of laxatives    Ignoring the urge to have a bowel movement or  delaying it until later    Medicines, such as certain prescription pain medicines, iron supplements, antacids, certain antidepressants, and calcium supplements    Diseases like irritable bowel syndrome, bowel obstructions, stroke, diabetes, thyroid disease, Parkinson disease, hemorrhoids, and colon cancer  Complications  Potential complications of constipation can include:    Hemorrhoids    Rectal bleeding from hemorrhoids or anal fissures (skin tears)    Hernias    Dependency on laxatives    Chronic constipation    Fecal impaction    Bowel obstruction or perforation  Home care  All treatment should be done after talking with your healthcare provider. This is especially true if you have another medical problems, are taking prescription medicines, or are an older adult. Treatment most often involves lifestyle changes. You may also need medicines. Your healthcare provider will tell you which will work best for you. Follow the advice below to help avoid this problem in the future.  Lifestyle changes  These lifestyle changes can help prevent constipation:    Diet. Eat a high-fiber diet, with fresh fruit and vegetables, and reduce dairy intake, meats, and processed foods    Fluids. It's important to get enough fluids each day. Drink plenty of water when you eat more fiber. If you are on diet that limits the amount of fluid you can have, talk about this with your healthcare provider.    Regular exercise. Check with your healthcare provider first.  Medications  Take any medicines as directed. Some laxatives are safe to use only every now and then. Others can be taken on a regular basis. Talk with your doctor or pharmacist if you have questions.  Prescription pain medicines can cause constipation. If you are taking this kind of medicine, ask your healthcare provider if you should also take a stool softener.  Medicines you may take to treat constipation include:    Fiber supplements    Stool  softeners    Laxatives    Enemas    Rectal suppositories  Follow-up care  Follow up with your healthcare provider if symptoms don't get better in the next few days. You may need to have more tests or see a specialist.  Call 911  Call 911 if any of these occur:    Trouble breathing    Stiff, rigid abdomen that is severely painful to touch    Confusion    Fainting or loss of consciousness    Rapid heart rate    Chest pain  When to seek medical advice  Call your healthcare provider right away if any of these occur:    Fever over 100.4 F (38 C)    Failure to resume normal bowel movements    Pain in your abdomen or back gets worse    Nausea or vomiting    Swelling in your abdomen    Blood in the stool    Black, tarry stool    Involuntary weight loss    Weakness  Date Last Reviewed: 12/30/2015 2000-2017 [employer]. [address] [place] [address]. All rights reserved. This information is not intended as a substitute for professional medical care. Always follow your healthcare professional's instructions.                Using an Incentive Spirometer    An incentive spirometer is a device that helps you do deep breathing exercises. These exercises expand your lungs, aid in circulation, and help prevent pneumonia. Deep breathing exercises also help you breathe better and improve the function of your lungs by:    Keeping your lungs clear    Strengthening your breathing muscles    Helping prevent respiratory complications or problems  The incentive spirometer gives you a way to take an active part in recover. A nurse or therapist will teach you breathing exercises. To do these exercises, you will breathe in through your mouth and not your nose. The incentive spirometer only works correctly if you breathe in through your mouth.  Steps to clear lungs  Step 1. Exhale normally. Then, inhale normally.    Relax and breathe out.  Step 2. Place your lips tightly around the mouthpiece.    Make sure the  device is upright and not tilted.  Step 3. Inhale as much air as you can through the mouthpiece (don't breath through your nose).    Inhale slowly and deeply.    Hold your breath long enough to keep the balls or disk raised for at least 3 to 5 seconds, or as instructed by your healthcare provider.    Some spirometers have an indicator to let you know that you are breathing in too fast. If the indicator goes off, breathe in more slowly.  Step 4. Repeat the exercise regularly.    Do this exercise every hour while you're awake, or as instructed by your healthcare provider.    If you were taught deep breathing and coughing exercises, do them regularly as instructed by your healthcare provider.   Follow-up care  Make a follow up appointment, or as directed. Also, follow up with your healthcare provider as advised or if your symptoms do not improve or continue to get worse.  When to call your healthcare provider  Call your healthcare provider right away if you have any of the following:    Fever 100.4  (38 C) or higher, or as directed by your healthcare provider    Brownish, bloody, or smelly sputum  Call 911  Call 911 if any of these occur:     Shortness of breath that doesn't get better after taking your medicine    Cool, moist, pale or blue skin    Trouble breathing or swallowing, wheezing    Fainting or loss of consciousness    Feeling of fizziness or weakness or a sudden drop in blood pressure    Feeling of doom or lightheaded    Chest pain or rapid heart rate  Date Last Reviewed: 1/1/2017 2000-2017 [employer]. [address] [place] [address]. All rights reserved. This information is not intended as a substitute for professional medical care. Always follow your healthcare professional's instructions.

## 2017-11-13 NOTE — OR NURSING
Three screws removed from right ankle by Dr. Cope intraoperatively in preparation for total ankle arthroplasty.

## 2017-11-13 NOTE — ANESTHESIA POSTPROCEDURE EVALUATION
Patient: Donny Cleveland    Procedure(s):  right total ankle arthroplasty, gastrocnemius recession, hardware removal    surgeon request choice anesthesia - Wound Class: I-Clean   - Wound Class: I-Clean      Diagnosis:ankle degenerative joint disease, gastroc contracture  Diagnosis Additional Information: ankle degenerative joint disease, gastroc contracture    Anesthesia Type:  General    Note:  Anesthesia Post Evaluation    Patient location during evaluation: PACU  Patient participation: Able to fully participate in evaluation  Level of consciousness: awake and alert  Pain management: adequate  Airway patency: patent  Cardiovascular status: acceptable  Respiratory status: acceptable  Hydration status: acceptable  PONV: controlled     Anesthetic complications: None          Last vitals:  Vitals:    11/13/17 1141 11/13/17 1149 11/13/17 1441   BP: 141/79 130/87 135/83   Pulse:      Resp: 18 12 12   Temp:   98.2  F (36.8  C)   SpO2: 99% 97% 97%         Electronically Signed By: Manny Butt MD  November 13, 2017  2:43 PM

## 2017-11-13 NOTE — IP AVS SNAPSHOT
[facility]    [address]    [facility] [address]    Phone:  [phone]    Fax:  [phone]                                       After Visit Summary   11/13/2017    Donny Cleveland    MRN: [id number]           After Visit Summary Signature Page     I have received my discharge instructions, and my questions have been answered. I have discussed any challenges I see with this plan with the nurse or doctor.    ..........................................................................................................................................  Patient/Patient Representative Signature      ..........................................................................................................................................  Patient Representative Print Name and Relationship to Patient    ..................................................               ................................................  Date                                            Time    ..........................................................................................................................................  Reviewed by Signature/Title    ...................................................              ..............................................  Date                                                            Time

## 2017-11-13 NOTE — ANESTHESIA PREPROCEDURE EVALUATION
Anesthesia Evaluation     . Pt has had prior anesthetic. Type: General           ROS/MED HX    ENT/Pulmonary:     (+)Intermittent asthma Treatment: Inhaler prn,  , . .    Neurologic:  - neg neurologic ROS     Cardiovascular:  - neg cardiovascular ROS       METS/Exercise Tolerance:     Hematologic:         Musculoskeletal:   (+) arthritis, , , other musculoskeletal- ankle pain      GI/Hepatic:  - neg GI/hepatic ROS       Renal/Genitourinary:  - ROS Renal section negative       Endo:  - neg endo ROS       Psychiatric:  - neg psychiatric ROS       Infectious Disease:  - neg infectious disease ROS       Malignancy:      - no malignancy   Other:    (+) No chance of pregnancy C-spine cleared: N/A, H/O Chronic Pain,no other significant disability                    Physical Exam  Normal systems: cardiovascular, pulmonary and dental    Airway   Mallampati: II  TM distance: >3 FB  Neck ROM: full    Dental     Cardiovascular       Pulmonary                     Anesthesia Plan      History & Physical Review  History and physical reviewed and following examination; no interval change.    ASA Status:  2 .    NPO Status:  > 8 hours    Plan for General with Intravenous induction. Maintenance will be Balanced.    PONV prophylaxis:  Ondansetron (or other 5HT-3) and Dexamethasone or Solumedrol       Postoperative Care  Postoperative pain management:  IV analgesics.      Consents  Anesthetic plan, risks, benefits and alternatives discussed with:  Patient.  Use of blood products discussed: Yes.   Use of blood products discussed with Patient.  Consented to blood products.  .                          .

## 2017-11-13 NOTE — BRIEF OP NOTE
[facility] Brief Operative Note    Pre-operative diagnosis: 1) Ankle arthritis, right  2) Gastrocnemius contracture, right   Post-operative diagnosis Same +  3) Lateral ligament instability, right ankle   Procedure: 1) Total Ankle Arthroplasty, right  2) Gastrocnemius contracture, right  3) Open lateral ligament reconstruction, right   Surgeon(s): Surgeon(s) and Role:     * Lorenzo Cope MD - Primary     * Aminata Walker PA-C - Assisting   Estimated blood loss: 10 mL    Specimens: none   Findings: ANES: REG + GEN  IMPLANTS: Jaime (3/4x/6mm), Arthrex internal brace

## 2017-11-13 NOTE — ANESTHESIA PROCEDURE NOTES
Peripheral nerve/Neuraxial procedure note : Femoral  Pre-Procedure  Performed by HENRY HICKS  Location: pre-op    Procedure Times:11/13/2017 11:22 AM and 11/13/2017 11:50 AM  Pre-Anesthestic Checklist: patient identified, IV checked, site marked, risks and benefits discussed, informed consent, monitors and equipment checked, pre-op evaluation, at physician/surgeon's request and post-op pain management    Timeout  Correct Patient: Yes   Correct Procedure: Yes   Correct Site: Yes   Correct Laterality: Yes   Correct Position: Yes   Site Marked: Yes   .   Procedure Documentation    Diagnosis:DJD ANKLE.    Procedure:    Femoral.     Ultrasound used to identify targeted nerve, plexus, or vascular marker and placed a needle adjacent to it., Ultrasound was used to visualize the spread of the anesthetic in close proximity to the above stated nerve.   Patient Prep;mask, sterile gloves, povidone-iodine 7.5% surgical scrub.  Nerve Stim: Initial Level 1 mA. Lowest motor response mA..  Needle: insulated, short bevel Needle Gauge: 22.    Needle Length (Inches) 2  Insertion Method: Single Shot.       Assessment/Narrative  Paresthesias: No.  .  The placement was negative for: blood aspirated, painful injection and site bleeding.  Bolus given via needle. No blood aspirated via catheter.   Secured via.   Complications: none. Test dose of mL lidocaine 2% w/ 1:200,000 epinephrine at. Test dose negative for signs of intravascular, subdural or intrathecal injection. Comments:  The surgeon has given a verbal order transferring care of this patient to me for the performance of a regional analgesia block for post-op pain control. It is requested of me because I am uniquely trained and qualified to perform this block and the surgeon is neither trained nor qualified to perform this procedure.    30ml of 0.5% Bupivicaine w/ 1:200,000 epi + 10ml of 2% Lidocaine injected

## 2017-11-14 ENCOUNTER — APPOINTMENT (OUTPATIENT)
Dept: PHYSICAL THERAPY | Facility: CLINIC | Age: 67
DRG: 469 | End: 2017-11-14
Attending: ORTHOPAEDIC SURGERY
Payer: MEDICARE

## 2017-11-14 VITALS
WEIGHT: 252 LBS | RESPIRATION RATE: 18 BRPM | HEIGHT: 72 IN | TEMPERATURE: 97.5 F | OXYGEN SATURATION: 93 % | BODY MASS INDEX: 34.13 KG/M2 | DIASTOLIC BLOOD PRESSURE: 78 MMHG | SYSTOLIC BLOOD PRESSURE: 145 MMHG | HEART RATE: 76 BPM

## 2017-11-14 LAB — HGB BLD-MCNC: 13.5 G/DL (ref 13.3–17.7)

## 2017-11-14 PROCEDURE — 97116 GAIT TRAINING THERAPY: CPT | Mod: GP | Performed by: PHYSICAL THERAPIST

## 2017-11-14 PROCEDURE — 25000132 ZZH RX MED GY IP 250 OP 250 PS 637: Mod: GY | Performed by: ORTHOPAEDIC SURGERY

## 2017-11-14 PROCEDURE — A9270 NON-COVERED ITEM OR SERVICE: HCPCS | Mod: GY | Performed by: ORTHOPAEDIC SURGERY

## 2017-11-14 PROCEDURE — 97530 THERAPEUTIC ACTIVITIES: CPT | Mod: GP | Performed by: PHYSICAL THERAPIST

## 2017-11-14 PROCEDURE — 85018 HEMOGLOBIN: CPT | Performed by: ORTHOPAEDIC SURGERY

## 2017-11-14 PROCEDURE — 36415 COLL VENOUS BLD VENIPUNCTURE: CPT | Performed by: ORTHOPAEDIC SURGERY

## 2017-11-14 PROCEDURE — 40000193 ZZH STATISTIC PT WARD VISIT: Performed by: PHYSICAL THERAPIST

## 2017-11-14 PROCEDURE — 25000128 H RX IP 250 OP 636: Performed by: ORTHOPAEDIC SURGERY

## 2017-11-14 PROCEDURE — 97161 PT EVAL LOW COMPLEX 20 MIN: CPT | Mod: GP | Performed by: PHYSICAL THERAPIST

## 2017-11-14 RX ORDER — GABAPENTIN 300 MG/1
300 CAPSULE ORAL 3 TIMES DAILY
Qty: 9 CAPSULE | Refills: 0 | Status: SHIPPED | OUTPATIENT
Start: 2017-11-14 | End: 2021-03-11

## 2017-11-14 RX ORDER — AMOXICILLIN 250 MG
1-2 CAPSULE ORAL 2 TIMES DAILY
Qty: 40 TABLET | Refills: 0 | Status: SHIPPED | OUTPATIENT
Start: 2017-11-14 | End: 2021-03-11

## 2017-11-14 RX ORDER — CALCIUM CARBONATE 500 MG/1
500-1000 TABLET, CHEWABLE ORAL
Status: DISCONTINUED | OUTPATIENT
Start: 2017-11-14 | End: 2017-11-14 | Stop reason: HOSPADM

## 2017-11-14 RX ORDER — OXYCODONE HYDROCHLORIDE 5 MG/1
5-10 TABLET ORAL EVERY 4 HOURS PRN
Qty: 24 TABLET | Refills: 0 | Status: SHIPPED | OUTPATIENT
Start: 2017-11-14 | End: 2021-03-11

## 2017-11-14 RX ADMIN — SENNOSIDES AND DOCUSATE SODIUM 1 TABLET: 8.6; 5 TABLET ORAL at 08:10

## 2017-11-14 RX ADMIN — CALCIUM CARBONATE (ANTACID) CHEW TAB 500 MG 1000 MG: 500 CHEW TAB at 04:57

## 2017-11-14 RX ADMIN — OXYCODONE HYDROCHLORIDE 10 MG: 5 TABLET ORAL at 03:59

## 2017-11-14 RX ADMIN — HYDROXYZINE HYDROCHLORIDE 10 MG: 10 TABLET ORAL at 08:11

## 2017-11-14 RX ADMIN — ACETAMINOPHEN 975 MG: 325 TABLET, FILM COATED ORAL at 01:32

## 2017-11-14 RX ADMIN — GABAPENTIN 300 MG: 300 CAPSULE ORAL at 08:10

## 2017-11-14 RX ADMIN — ACETAMINOPHEN 975 MG: 325 TABLET, FILM COATED ORAL at 10:44

## 2017-11-14 RX ADMIN — Medication 0.4 MG: at 06:13

## 2017-11-14 RX ADMIN — OXYCODONE HYDROCHLORIDE 10 MG: 5 TABLET ORAL at 00:20

## 2017-11-14 RX ADMIN — CALCIUM CARBONATE (ANTACID) CHEW TAB 500 MG 1000 MG: 500 CHEW TAB at 08:20

## 2017-11-14 RX ADMIN — CYCLOBENZAPRINE HYDROCHLORIDE 5 MG: 5 TABLET, FILM COATED ORAL at 06:13

## 2017-11-14 RX ADMIN — Medication 0.4 MG: at 04:04

## 2017-11-14 RX ADMIN — CEFAZOLIN SODIUM 2 G: 2 INJECTION, SOLUTION INTRAVENOUS at 04:00

## 2017-11-14 RX ADMIN — OXYCODONE HYDROCHLORIDE 10 MG: 5 TABLET ORAL at 08:10

## 2017-11-14 RX ADMIN — ONDANSETRON 4 MG: 2 INJECTION INTRAMUSCULAR; INTRAVENOUS at 04:52

## 2017-11-14 NOTE — PROGRESS NOTES
Reviewed discharge instructions and medications with patient and wife. Questions answered. Patient discharged to home with wife driving, discharge instructions, scripts for Oxycodone/Gabapentin/Senokot, and belongings at this time.

## 2017-11-14 NOTE — PLAN OF CARE
Problem: Patient Care Overview  Goal: Plan of Care/Patient Progress Review  Physical Therapy Discharge Summary     Reason for therapy discharge:    Discharged to home.  All goals and outcomes met, no further needs identified.     Progress towards therapy goal(s). See goals on Care Plan in [facility] electronic health record for goal details.  Goals met     Therapy recommendation(s):    assist from spouse as needed for safety

## 2017-11-14 NOTE — DISCHARGE INSTRUCTIONS
TAKE HOME INSTRUCTIONS FOLLOWING FRACTURED ANKLE SURGERY  Your surgeon will answer any questions about your progress. General guidelines for your care are listed below. Your surgeon may give additional instructions for your care at home. Please follow them carefully.    Activity Level  1. Physical activity may be resumed gradually according to your comfort level and your surgeon s instructions.  2. Increase your time and distance as you gain strength. Use your walker/crutches and limit the amount of weight you place on your fractured ankle, as instructed by your surgeon. Your surgeon will determine (in the follow-up office visits) when these restrictions no longer apply.  3. Allow yourself rest periods during the day. As you regain strength, you will need  fewer rest periods. It is recommended that your ankle be elevated when lying or sitting, to reduce swelling and pain.    Good Health Practices  1. Maintain an adequate fluid intake and eat a well balanced diet.  2. Be sure to include the basic food groups, such as dairy products, meat/fish,  vegetables, and fruit. Each of these foods contribute to your wound healing and increasing your strength.  3. Surgery, decreased activity and pain medication all contribute to a decrease in bowel activity that can result in constipation. It is recommended that you increase your liquid intake, and fiber to your diet, increase activity, and decrease pain medication use. If you have any problems, notify your physician.    Incision/Dressing Care  Keep cast/splint clean and dry. Do not stick items between cast and leg as this can cause skin or incision damage.    Things to Watch For  Notify your surgeon if you notice any of the followin. Any significant drainage on your cast/splint  2. Increased swelling or tightness of the cast/splint  3. Increased pain, numbness or tingling of toes or foot  4. Fever above 101   5. Calf pain  6. Any other questions or concerns related to  your surgery/recovery.      Thank you for allowing [facility] to participate in your cares!!

## 2017-11-14 NOTE — PLAN OF CARE
Problem: Surgery Nonspecified (Adult)  Goal: Signs and Symptoms of Listed Potential Problems Will be Absent, Minimized or Managed (Surgery Nonspecified)  Signs and symptoms of listed potential problems will be absent, minimized or managed by discharge/transition of care (reference Surgery Nonspecified (Adult) CPG).   Outcome: Improving   Patient A/O x 4. VSS, CMS intact. Cast on right leg but able to wiggle toes. Block wore off during the night,had 10 mg of oxycodone x 2 and 0.4 mg of IV dilaudid x 2 for pain  Rated at 10/10. Flexeril  Also administered.C/o nausea  And had Zofran x 1 . Also had Tums for heart burn. Up with assist of  1 using walker and gait belt.Up in chair at 0600.  IV saline locked. Voiding well and drinking well. Passing gas this morning

## 2017-11-14 NOTE — PLAN OF CARE
Problem: Patient Care Overview  Goal: Plan of Care/Patient Progress Review  PT: order received. 1x evaluation and treatment completed prior to patient discharge to home with assist of wife; Patient has 2 steps to enter home; no rail; will have assist of son. Has all needed equipment from prior L TAA; Patient normally independent and active without an assistive device;denies dizziness; no longer having nausea; wife present during session.  Discharge Planner PT   Patient plan for discharge: return home today with assist of wife  Current status: Patient modified independent for bed mobility; CGA/SBA for gait/transfers with a rolling walker and SBA for use of a knee scooter x 150 feet; impulsive with movement at times and cues to slow down for safety; has all needed equipment at home. Declined trial of stairs; will have assist of son; able to maintain NWB status on R LE with mobility.  Barriers to return to prior living situation: 2 steps; will have assist of wife and son; current need for assist  Recommendations for discharge: Home with assist of spouse  Rationale for recommendations: still needs CGA/SBA and occasional safety cues as he moves impulsively at times; patient is NWB       Entered by: Deidra Perez 11/14/2017 10:00 AM

## 2017-11-14 NOTE — PROGRESS NOTES
11/14/17 0900   Quick Adds   Type of Visit Initial PT Evaluation   Living Environment   Lives With spouse   Living Arrangements house   Home Accessibility stairs to enter home   Number of Stairs to Enter Home 2  (no rail; deep steps)   Number of Stairs Within Home 0   Transportation Available car;family or friend will provide   Living Environment Comment 2 steps to enter home; deep and can use walker   Self-Care   Usual Activity Tolerance good   Current Activity Tolerance moderate   Regular Exercise no   Equipment Currently Used at Home none  (knee scooter, walker, crutches, wheelchair)   Activity/Exercise/Self-Care Comment patient normally independent   Functional Level Prior   Ambulation 0-->independent   Transferring 0-->independent   Toileting 0-->independent   Bathing 0-->independent   Dressing 0-->independent   Eating 0-->independent   Communication 0-->understands/communicates without difficulty   Swallowing 0-->swallows foods/liquids without difficulty   Cognition 0 - no cognition issues reported   Fall history within last six months no   Which of the above functional risks had a recent onset or change? ambulation;transferring;toileting   Prior Functional Level Comment patient normally independent without an assistive device; L TAA less than a year ago   General Information   Onset of Illness/Injury or Date of Surgery - Date 11/13/17   Referring Physician Lorenzo Cope MD   Patient/Family Goals Statement return home with wife today   Pertinent History of Current Problem (include personal factors and/or comorbidities that impact the POC) Patient with R ankle arthritis, R Gastrocnemius contracture, and R Lateral ligament instability who underwent R TAA and R Open lateral ligament reconstruction; see medical record for further information   Precautions/Limitations fall precautions   Weight-Bearing Status - RLE nonweight-bearing   General Observations VSS   Cognitive Status Examination   Orientation  orientation to person, place and time   Level of Consciousness alert   Follows Commands and Answers Questions 100% of the time   Personal Safety and Judgment at risk behaviors demonstrated;impulsive   Memory intact   Pain Assessment   Patient Currently in Pain No   Integumentary/Edema   Integumentary/Edema Comments R LE casted   Posture    Posture Comments WFL   Range of Motion (ROM)   ROM Comment R lower leg limited by cast; others appear WFL   Strength   Strength Comments WFL; R lower leg limited by recent surgery   Bed Mobility   Bed Mobility Comments SBA to modified independent with use of bedrail   Transfer Skills   Transfer Comments sit<>stand with use of a walker or knee scooter and safety cues; CGA   Gait   Gait Comments CGA for gait with a rolling walker; cues to slow down; able to ambulate 50 feet; use of knee scooter with CGA/SBA   Balance   Balance Comments impaired; current need for walker or knee scooter   Sensory Examination   Sensory Perception Comments intact   General Therapy Interventions   Planned Therapy Interventions gait training;transfer training   Clinical Impression   Criteria for Skilled Therapeutic Intervention yes, treatment indicated   PT Diagnosis impaired gait/transfers   Influenced by the following impairments NWB on R LE; impulsive with movement   Functional limitations due to impairments impaired independence with functional mobility   Clinical Presentation Stable/Uncomplicated   Clinical Presentation Rationale CGA or less for mobility   Clinical Decision Making (Complexity) Low complexity   Therapy Frequency` other (see comments)  (1x eval and treat prior to discharge)   Predicted Duration of Therapy Intervention (days/wks) 1x eval and treatment   Anticipated Equipment Needs at Discharge front wheeled walker;other (see comments)  (knee scooter)   Anticipated Discharge Disposition Home with Assist   Risk & Benefits of therapy have been explained Yes   Patient, Family & other staff  "in agreement with plan of care Yes   [facility] TM \"6 Clicks\"   2016, Trustees of [facility], under license to [employer].  All rights reserved.   6 Clicks Short Forms Basic Mobility Inpatient Short Form   [facility]  \"6 Clicks\" V.2 Basic Mobility Inpatient Short Form   1. Turning from your back to your side while in a flat bed without using bedrails? 4 - None   2. Moving from lying on your back to sitting on the side of a flat bed without using bedrails? 3 - A Little   3. Moving to and from a bed to a chair (including a wheelchair)? 3 - A Little   4. Standing up from a chair using your arms (e.g., wheelchair, or bedside chair)? 3 - A Little   5. To walk in hospital room? 3 - A Little   6. Climbing 3-5 steps with a railing? 3 - A Little   Basic Mobility Raw Score (Score out of 24.Lower scores equate to lower levels of function) 19   Total Evaluation Time   Total Evaluation Time (Minutes) 10     "

## 2017-11-14 NOTE — PROGRESS NOTES
S:  POD #1 s/p right total ankle arthroplasty, gastroc recession and lateral ligament reconstruction.   The patient reports he is having more difficulty with pain this time.  His block was not as long acting and wore off overnight.  He is having increased pain.  He is trying to be on oral medication for this.  He is planning to go home upon discharge, today if possible.    O:  T: 97.5, P: 76,  HR: 73,  R: 18,  BP: 145/78,  SpO2: 93    Patient is sitting up bedside, his leg is below heart level, but is on a foot stool.  He is in no obvious distress, but appears fatigued.  His cast is clean, dry and intact.  He is able to bend his knee and wiggle his toes.  Neurovascular exam is intact.  His left calf is soft and nontender to palpation.    A/P:  POD #1 s/p right total ankle arthroplasty, gastroc recession and lateral ligament reconstruction, stable  No weightbearing right leg full time  Elevate leg at heart level for edema control  Oral medication as needed for pain  PT/OT for gait, transfers and ADL's, patient has prior experience with this, so would not expect significant intervention  Patient may d/c when cleared by PT/OT and pain is controlled    Wild Walker PA-C

## 2017-11-14 NOTE — PLAN OF CARE
Problem: Patient Care Overview  Goal: Plan of Care/Patient Progress Review  Outcome: Improving  A/o.  Cast intact.  RLE elevated.  Toes pink and warm.  Numbness remains to RLE.  Taking Oxycodone and Ibuprofen for pain.  Voiding.  Tolerating regular diet.  Refused to dangle this evening.  Home at discharge.  Will continue to monitor.

## 2017-11-18 NOTE — DISCHARGE SUMMARY
DATE OF ADMISSION:  2017.       DATE OF DISCHARGE:  2017.      ADMISSION DIAGNOSIS:  Ankle arthritis, right.      PROCEDURES PERFORMED THIS ADMISSION:  Total ankle arthroplasty, right.      INDICATIONS FOR ADMISSION:  Donny Rodrigez is a 67-year-old man who presented for an elective total ankle arthroplasty.  His procedure was performed the day of admission without complication.      HOSPITAL COURSE:  He was admitted overnight.  He saw physical therapy and was cleared for discharge the day after surgery.  Please see the chart for further details and discharge instructions.         MONET GALLARDO MD             D: 2017 13:29   T: 2017 13:43   MT: [id number]      Name:     DONNY RODRIGEZ   MRN:      [id number]        Account:        [id number]   :      1950           Admit Date:                                       Discharge Date: 2017      Document: [id number]

## 2017-11-20 NOTE — OP NOTE
DATE OF SERVICE:  11/13/2017      PREOPERATIVE DIAGNOSES:   1.  Ankle arthritis, right.   2.  Gastrocnemius contracture, right.      POSTOPERATIVE DIAGNOSES:   1.  Ankle arthritis, right.   2.  Gastrocnemius contracture, right.   3.  Chronic lateral ligament instability, right ankle.      PROCEDURE:   1.  Total ankle arthroplasty, right.   2.  Gastrocnemius recession, right.   3.  Open lateral ligament reconstruction, right.      SURGEON:  Lorenzo Cope MD      ASSISTANT:  Christopher Walker PA-C      ANESTHESIA:  Regional plus general.      ESTIMATED BLOOD LOSS:  10 cc.      DRAINS:  None.      SPECIMENS:  None.      COMPLICATIONS:  None.      IMPLANTS:  Integra Linnea (3/4x/6 mm), Arthrex InternalBrace.      INDICATIONS FOR ADMISSION:  Donny Cleveland is a 67-year-old man who presented for an elective total ankle arthroplasty.  He had previously undergone a total ankle arthroplasty on the opposite side with good results.  His preoperative exam was consistent with a gastrocnemius contracture.      PROCEDURE IN DETAIL AND FINDINGS:  The patient was identified in the preoperative area and appropriately marked.  He underwent a regional anesthetic by the anesthesia team.  He was brought to the operating room where general anesthetic was administered and airway secured without difficulty.  Preoperative antibiotic prophylaxis was provided within an hour of the incision.  Prophylaxis was discontinued on the day of surgery.  A tourniquet was applied to the right thigh.  The right leg and ankle were prepped and draped in sterile fashion.  The limb was elevated for exsanguination, and the tourniquet inflated.  A pause for the cause was performed.      I made an anterior approach to the ankle joint.  When the joint was exposed, the external tibial guide was placed and positioned.  The distal sizing guide was placed and was checked on C-arm.  When appropriately aligned, a lateral view was used to confirm the resection level.   The tibial guide was then pinned and the distal tibia was cut.  The bony fragment was removed.  The remaining cartilage in the dorsal talus was removed.  The talar cutting guide was placed.  Dorsal talar cut was made.  All pins and guides were then removed.  The talar sizing guide was placed and pinned.  A posterior chamfer cut was made.  The anterior guide was placed, and the anterior chamfer cut was made.  Given the shape of the talus, we had some difficulty aligning the talar cut, but were then able to get this to the appropriate positioning.  The talar pegs were drilled.  At this point, we trialled the components.  The 4x tibia was felt to be appropriate.  This tibial trial was pinned, and the talar pegs were drilled.  All trials were then removed.  The true components were checked and opened.  The joint surfaces were irrigated with sterile saline and dried.  I then placed the tibia, followed by the talus and the poly.      At this point, I checked the range of motion.  The gastrocnemius was still quite tight.  Therefore, I proceeded with gastrocnemius recession.      A medial incision was made at the distal aspect of the gastroc muscle belly.  The interval between the gastroc and soleus was identified.  I performed a Hannah type gastrocnemius recession.  The wound was irrigated and closed in layers.      I then checked the stability of the joint.  There was significant lateral ligament instability.  Therefore, I elected to proceed with open lateral ligament reconstruction.  A lateral curvilinear incision was made.  The lateral capsular ligamentous structures were identified and split in their midsection.  This ran from the peroneal tendons posteriorly up to the syndesmosis anteriorly.  I placed two PDS sutures in a vest-over-pants fashion, and tagged these for later completion.  I then placed a Healicoil anchor into the lateral talus at the insertion of the point of the ATFL.  The tapes were secured to the  distal anterior fibula with a 2.9 Bioraptor.  I then tied my two PDS sutures, completing the soft tissue reconstruction, and reinforced the lateral capsule ligamentous repair with the #2 suture from the anchor.      At this point, the talar tilt and anterolateral drawer tests were stable.  There was excellent range of motion of the joint.      AP, mortise and lateral views of the right ankle were taken and reviewed.  The implant was stable and well aligned.      The wounds were then irrigated and closed in layers.  Adaptic, sterile dressings, and a well-padded short leg cast were applied.      The patient tolerated the procedure well.  There were no complications.  All sponge and needle counts were correct.      PLAN:  He will be nonweightbearing for 2 weeks in his cast.  He will then return for cast off, wound check, sutures out, and placement of an Aircast boot.  He can then begin range of motion and progress to full weightbearing.  At 6 weeks, we will convert him to a lateral ligament protocol with a TriLock ankle brace.         MONET GALLARDO MD             D: 2017 17:38   T: 2017 08:28   MT: [id number]      Name:     JULIANA RODRIGEZ   MRN:      [id number]        Account:        [id number]   :      1950           Procedure Date: 2017      Document: [id number]       cc: Tommy Piedra MD

## 2021-02-24 DIAGNOSIS — Z11.59 ENCOUNTER FOR SCREENING FOR OTHER VIRAL DISEASES: ICD-10-CM

## 2021-03-09 DIAGNOSIS — Z11.59 ENCOUNTER FOR SCREENING FOR OTHER VIRAL DISEASES: ICD-10-CM

## 2021-03-09 LAB
SARS-COV-2 RNA RESP QL NAA+PROBE: NORMAL
SPECIMEN SOURCE: NORMAL

## 2021-03-09 PROCEDURE — U0003 INFECTIOUS AGENT DETECTION BY NUCLEIC ACID (DNA OR RNA); SEVERE ACUTE RESPIRATORY SYNDROME CORONAVIRUS 2 (SARS-COV-2) (CORONAVIRUS DISEASE [COVID-19]), AMPLIFIED PROBE TECHNIQUE, MAKING USE OF HIGH THROUGHPUT TECHNOLOGIES AS DESCRIBED BY CMS-2020-01-R: HCPCS | Performed by: ORTHOPAEDIC SURGERY

## 2021-03-09 PROCEDURE — U0005 INFEC AGEN DETEC AMPLI PROBE: HCPCS | Performed by: ORTHOPAEDIC SURGERY

## 2021-03-10 LAB
LABORATORY COMMENT REPORT: NORMAL
SARS-COV-2 RNA RESP QL NAA+PROBE: NEGATIVE
SPECIMEN SOURCE: NORMAL

## 2021-03-11 RX ORDER — MULTIVITAMIN,THERAPEUTIC
1 TABLET ORAL DAILY
COMMUNITY

## 2021-03-11 RX ORDER — TIMOLOL MALEATE 5 MG/ML
1 SOLUTION/ DROPS OPHTHALMIC EVERY MORNING
COMMUNITY

## 2021-03-11 RX ORDER — LATANOPROST 50 UG/ML
1 SOLUTION/ DROPS OPHTHALMIC EVERY EVENING
COMMUNITY

## 2021-03-11 NOTE — PROGRESS NOTES
PTA medications updated by Medication Scribe prior to surgery via phone call with patient        Comments:    Medication history sources: Patient, Surescripts, H&P and Patient's home med list  Medication history source reliability: Good  Adherence assessment: N/A Not Observed    Significant changes made to the medication list:  None      Additional medication history information:   None        Prior to Admission medications    Medication Sig Last Dose Taking? Auth Provider   latanoprost (XALATAN) 0.005 % ophthalmic solution Place 1 drop into both eyes every evening  at PM Yes Reported, Patient   multivitamin, therapeutic (THERA-VIT) TABS tablet Take 1 tablet by mouth daily 3/11/2021 at AM Yes Reported, Patient   tadalafil (CIALIS) 5 MG tablet Take 5 mg by mouth daily as needed   at PRN Yes Reported, Patient   timolol maleate (TIMOPTIC) 0.5 % ophthalmic solution Place 1 drop into both eyes every morning  at AM Yes Reported, Patient

## 2021-03-12 ENCOUNTER — APPOINTMENT (OUTPATIENT)
Dept: GENERAL RADIOLOGY | Facility: CLINIC | Age: 71
End: 2021-03-12
Attending: ORTHOPAEDIC SURGERY
Payer: MEDICARE

## 2021-03-12 ENCOUNTER — ANESTHESIA (OUTPATIENT)
Dept: SURGERY | Facility: CLINIC | Age: 71
End: 2021-03-12
Payer: MEDICARE

## 2021-03-12 ENCOUNTER — APPOINTMENT (OUTPATIENT)
Dept: PHYSICAL THERAPY | Facility: CLINIC | Age: 71
End: 2021-03-12
Attending: ORTHOPAEDIC SURGERY
Payer: MEDICARE

## 2021-03-12 ENCOUNTER — HOSPITAL ENCOUNTER (OUTPATIENT)
Facility: CLINIC | Age: 71
Discharge: HOME | End: 2021-03-13
Attending: ORTHOPAEDIC SURGERY | Admitting: ORTHOPAEDIC SURGERY
Payer: MEDICARE

## 2021-03-12 ENCOUNTER — ANESTHESIA EVENT (OUTPATIENT)
Dept: SURGERY | Facility: CLINIC | Age: 71
End: 2021-03-12
Payer: MEDICARE

## 2021-03-12 DIAGNOSIS — Z96.651 S/P TOTAL KNEE ARTHROPLASTY, RIGHT: Primary | ICD-10-CM

## 2021-03-12 PROCEDURE — 710N000009 HC RECOVERY PHASE 1, LEVEL 1, PER MIN: Performed by: ORTHOPAEDIC SURGERY

## 2021-03-12 PROCEDURE — 258N000003 HC RX IP 258 OP 636: Performed by: ANESTHESIOLOGY

## 2021-03-12 PROCEDURE — 250N000009 HC RX 250

## 2021-03-12 PROCEDURE — C1776 JOINT DEVICE (IMPLANTABLE): HCPCS | Performed by: ORTHOPAEDIC SURGERY

## 2021-03-12 PROCEDURE — 272N000001 HC OR GENERAL SUPPLY STERILE: Performed by: ORTHOPAEDIC SURGERY

## 2021-03-12 PROCEDURE — 360N000077 HC SURGERY LEVEL 4, PER MIN: Performed by: ORTHOPAEDIC SURGERY

## 2021-03-12 PROCEDURE — 258N000003 HC RX IP 258 OP 636: Performed by: ORTHOPAEDIC SURGERY

## 2021-03-12 PROCEDURE — 999N000141 HC STATISTIC PRE-PROCEDURE NURSING ASSESSMENT: Performed by: ORTHOPAEDIC SURGERY

## 2021-03-12 PROCEDURE — 97161 PT EVAL LOW COMPLEX 20 MIN: CPT | Mod: GP

## 2021-03-12 PROCEDURE — 258N000003 HC RX IP 258 OP 636

## 2021-03-12 PROCEDURE — 250N000009 HC RX 250: Performed by: ORTHOPAEDIC SURGERY

## 2021-03-12 PROCEDURE — 250N000011 HC RX IP 250 OP 636: Performed by: ANESTHESIOLOGY

## 2021-03-12 PROCEDURE — 250N000011 HC RX IP 250 OP 636

## 2021-03-12 PROCEDURE — 999N000065 XR KNEE PORT RT 1/2 VW: Mod: RT

## 2021-03-12 PROCEDURE — 370N000017 HC ANESTHESIA TECHNICAL FEE, PER MIN: Performed by: ORTHOPAEDIC SURGERY

## 2021-03-12 PROCEDURE — 97116 GAIT TRAINING THERAPY: CPT | Mod: GP

## 2021-03-12 PROCEDURE — 278N000051 HC OR IMPLANT GENERAL: Performed by: ORTHOPAEDIC SURGERY

## 2021-03-12 PROCEDURE — 250N000011 HC RX IP 250 OP 636: Performed by: ORTHOPAEDIC SURGERY

## 2021-03-12 PROCEDURE — 97110 THERAPEUTIC EXERCISES: CPT | Mod: GP

## 2021-03-12 PROCEDURE — 250N000013 HC RX MED GY IP 250 OP 250 PS 637: Mod: GY | Performed by: ORTHOPAEDIC SURGERY

## 2021-03-12 DEVICE — IMPLANTABLE DEVICE
Type: IMPLANTABLE DEVICE | Site: KNEE | Status: FUNCTIONAL
Brand: VANGUARD® KNEE SYSTEM

## 2021-03-12 DEVICE — IMP COMP PATELLA BIOM 3 PEG 34MM STD 184766: Type: IMPLANTABLE DEVICE | Site: KNEE | Status: FUNCTIONAL

## 2021-03-12 DEVICE — SIMPLEX® HV IS A FAST-SETTING ACRYLIC RESIN FOR USE IN BONE SURGERY. MIXING THE TWO SEPARATE STERILE COMPONENTS PRODUCES A DUCTILE BONE CEMENT WHICH, AFTER HARDENING, FIXES THE IMPLANT AND TRANSFERS STRESSES PRODUCED DURING MOVEMENT EVENLY TO THE BONE. SIMPLEX® HV CEMENT POWDER ALSO CONTAINS INSOLUBLE ZIRCONIUM DIOXIDE AS AN X-RAY CONTRAST MEDIUM. SIMPLEX® HV DOES NOT EMIT A SIGNAL AND DOES NOT POSE A SAFETY RISK IN A MAGNETIC RESONANCE ENVIRONMENT.
Type: IMPLANTABLE DEVICE | Site: KNEE | Status: FUNCTIONAL
Brand: SIMPLEX HV

## 2021-03-12 DEVICE — IMP COMP TIBIAL KNEE ASCENT 75MM 141224: Type: IMPLANTABLE DEVICE | Site: KNEE | Status: FUNCTIONAL

## 2021-03-12 DEVICE — IMP COMP FEMORAL VANGARD BIOM PS RT 67.5MM 183110: Type: IMPLANTABLE DEVICE | Site: KNEE | Status: FUNCTIONAL

## 2021-03-12 RX ORDER — TRANEXAMIC ACID 650 MG/1
1950 TABLET ORAL ONCE
Status: COMPLETED | OUTPATIENT
Start: 2021-03-12 | End: 2021-03-12

## 2021-03-12 RX ORDER — OXYCODONE HYDROCHLORIDE 5 MG/1
10 TABLET ORAL
Status: DISCONTINUED | OUTPATIENT
Start: 2021-03-12 | End: 2021-03-13 | Stop reason: HOSPADM

## 2021-03-12 RX ORDER — HYDROMORPHONE HYDROCHLORIDE 1 MG/ML
0.3 INJECTION, SOLUTION INTRAMUSCULAR; INTRAVENOUS; SUBCUTANEOUS EVERY 30 MIN PRN
Status: DISCONTINUED | OUTPATIENT
Start: 2021-03-12 | End: 2021-03-13 | Stop reason: HOSPADM

## 2021-03-12 RX ORDER — CELECOXIB 100 MG/1
100 CAPSULE ORAL 2 TIMES DAILY
Status: DISCONTINUED | OUTPATIENT
Start: 2021-03-12 | End: 2021-03-13 | Stop reason: HOSPADM

## 2021-03-12 RX ORDER — ACETAMINOPHEN 325 MG/1
975 TABLET ORAL ONCE
Status: COMPLETED | OUTPATIENT
Start: 2021-03-12 | End: 2021-03-12

## 2021-03-12 RX ORDER — NALOXONE HYDROCHLORIDE 0.4 MG/ML
0.2 INJECTION, SOLUTION INTRAMUSCULAR; INTRAVENOUS; SUBCUTANEOUS
Status: ACTIVE | OUTPATIENT
Start: 2021-03-12 | End: 2021-03-13

## 2021-03-12 RX ORDER — HYDROXYZINE HYDROCHLORIDE 10 MG/1
10 TABLET, FILM COATED ORAL EVERY 6 HOURS PRN
Qty: 30 TABLET | Refills: 0 | Status: SHIPPED | OUTPATIENT
Start: 2021-03-12

## 2021-03-12 RX ORDER — ACETAMINOPHEN 325 MG/1
650 TABLET ORAL EVERY 4 HOURS PRN
Qty: 100 TABLET | Refills: 0 | Status: SHIPPED | OUTPATIENT
Start: 2021-03-12

## 2021-03-12 RX ORDER — AMOXICILLIN 250 MG
1-2 CAPSULE ORAL 2 TIMES DAILY
Qty: 30 TABLET | Refills: 0 | Status: SHIPPED | OUTPATIENT
Start: 2021-03-12

## 2021-03-12 RX ORDER — CEFAZOLIN SODIUM 2 G/100ML
2 INJECTION, SOLUTION INTRAVENOUS EVERY 8 HOURS
Status: COMPLETED | OUTPATIENT
Start: 2021-03-12 | End: 2021-03-13

## 2021-03-12 RX ORDER — DEXAMETHASONE SODIUM PHOSPHATE 4 MG/ML
INJECTION, SOLUTION INTRA-ARTICULAR; INTRALESIONAL; INTRAMUSCULAR; INTRAVENOUS; SOFT TISSUE PRN
Status: DISCONTINUED | OUTPATIENT
Start: 2021-03-12 | End: 2021-03-12

## 2021-03-12 RX ORDER — GLYCOPYRROLATE 0.2 MG/ML
INJECTION, SOLUTION INTRAMUSCULAR; INTRAVENOUS PRN
Status: DISCONTINUED | OUTPATIENT
Start: 2021-03-12 | End: 2021-03-12

## 2021-03-12 RX ORDER — SODIUM CHLORIDE, SODIUM LACTATE, POTASSIUM CHLORIDE, CALCIUM CHLORIDE 600; 310; 30; 20 MG/100ML; MG/100ML; MG/100ML; MG/100ML
INJECTION, SOLUTION INTRAVENOUS CONTINUOUS
Status: DISCONTINUED | OUTPATIENT
Start: 2021-03-12 | End: 2021-03-12 | Stop reason: HOSPADM

## 2021-03-12 RX ORDER — METHOCARBAMOL 500 MG/1
500 TABLET, FILM COATED ORAL EVERY 6 HOURS PRN
Status: DISCONTINUED | OUTPATIENT
Start: 2021-03-12 | End: 2021-03-13 | Stop reason: HOSPADM

## 2021-03-12 RX ORDER — FENTANYL CITRATE 50 UG/ML
25-100 INJECTION, SOLUTION INTRAMUSCULAR; INTRAVENOUS
Status: COMPLETED | OUTPATIENT
Start: 2021-03-12 | End: 2021-03-12

## 2021-03-12 RX ORDER — BUPIVACAINE HYDROCHLORIDE 7.5 MG/ML
INJECTION, SOLUTION INTRASPINAL PRN
Status: DISCONTINUED | OUTPATIENT
Start: 2021-03-12 | End: 2021-03-12

## 2021-03-12 RX ORDER — PROPOFOL 10 MG/ML
INJECTION, EMULSION INTRAVENOUS CONTINUOUS PRN
Status: DISCONTINUED | OUTPATIENT
Start: 2021-03-12 | End: 2021-03-12

## 2021-03-12 RX ORDER — LATANOPROST 50 UG/ML
1 SOLUTION/ DROPS OPHTHALMIC EVERY EVENING
Status: DISCONTINUED | OUTPATIENT
Start: 2021-03-12 | End: 2021-03-13 | Stop reason: HOSPADM

## 2021-03-12 RX ORDER — MAGNESIUM HYDROXIDE/ALUMINUM HYDROXICE/SIMETHICONE 120; 1200; 1200 MG/30ML; MG/30ML; MG/30ML
30 SUSPENSION ORAL EVERY 4 HOURS PRN
Status: DISCONTINUED | OUTPATIENT
Start: 2021-03-12 | End: 2021-03-13 | Stop reason: HOSPADM

## 2021-03-12 RX ORDER — FENTANYL CITRATE 50 UG/ML
25-50 INJECTION, SOLUTION INTRAMUSCULAR; INTRAVENOUS
Status: DISCONTINUED | OUTPATIENT
Start: 2021-03-12 | End: 2021-03-12 | Stop reason: HOSPADM

## 2021-03-12 RX ORDER — NALOXONE HYDROCHLORIDE 0.4 MG/ML
0.4 INJECTION, SOLUTION INTRAMUSCULAR; INTRAVENOUS; SUBCUTANEOUS
Status: ACTIVE | OUTPATIENT
Start: 2021-03-12 | End: 2021-03-13

## 2021-03-12 RX ORDER — DOCUSATE SODIUM 100 MG/1
100 CAPSULE, LIQUID FILLED ORAL 2 TIMES DAILY
Status: DISCONTINUED | OUTPATIENT
Start: 2021-03-12 | End: 2021-03-13 | Stop reason: HOSPADM

## 2021-03-12 RX ORDER — BISACODYL 10 MG
10 SUPPOSITORY, RECTAL RECTAL DAILY PRN
Status: DISCONTINUED | OUTPATIENT
Start: 2021-03-12 | End: 2021-03-13 | Stop reason: HOSPADM

## 2021-03-12 RX ORDER — HYDROMORPHONE HYDROCHLORIDE 1 MG/ML
.3-.5 INJECTION, SOLUTION INTRAMUSCULAR; INTRAVENOUS; SUBCUTANEOUS EVERY 5 MIN PRN
Status: DISCONTINUED | OUTPATIENT
Start: 2021-03-12 | End: 2021-03-12 | Stop reason: HOSPADM

## 2021-03-12 RX ORDER — CEFAZOLIN SODIUM 2 G/100ML
2 INJECTION, SOLUTION INTRAVENOUS SEE ADMIN INSTRUCTIONS
Status: DISCONTINUED | OUTPATIENT
Start: 2021-03-12 | End: 2021-03-12 | Stop reason: HOSPADM

## 2021-03-12 RX ORDER — ONDANSETRON 2 MG/ML
4 INJECTION INTRAMUSCULAR; INTRAVENOUS EVERY 6 HOURS PRN
Status: DISCONTINUED | OUTPATIENT
Start: 2021-03-12 | End: 2021-03-13 | Stop reason: HOSPADM

## 2021-03-12 RX ORDER — ONDANSETRON 4 MG/1
4 TABLET, ORALLY DISINTEGRATING ORAL EVERY 6 HOURS PRN
Status: DISCONTINUED | OUTPATIENT
Start: 2021-03-12 | End: 2021-03-13 | Stop reason: HOSPADM

## 2021-03-12 RX ORDER — EPHEDRINE SULFATE 50 MG/ML
INJECTION, SOLUTION INTRAMUSCULAR; INTRAVENOUS; SUBCUTANEOUS PRN
Status: DISCONTINUED | OUTPATIENT
Start: 2021-03-12 | End: 2021-03-12

## 2021-03-12 RX ORDER — AMOXICILLIN 250 MG
1 CAPSULE ORAL 2 TIMES DAILY
Status: DISCONTINUED | OUTPATIENT
Start: 2021-03-12 | End: 2021-03-13 | Stop reason: HOSPADM

## 2021-03-12 RX ORDER — OXYCODONE HCL 10 MG/1
10 TABLET, FILM COATED, EXTENDED RELEASE ORAL ONCE
Status: COMPLETED | OUTPATIENT
Start: 2021-03-12 | End: 2021-03-12

## 2021-03-12 RX ORDER — CEFAZOLIN SODIUM 2 G/100ML
2 INJECTION, SOLUTION INTRAVENOUS
Status: COMPLETED | OUTPATIENT
Start: 2021-03-12 | End: 2021-03-12

## 2021-03-12 RX ORDER — OXYCODONE HYDROCHLORIDE 5 MG/1
5 TABLET ORAL
Status: DISCONTINUED | OUTPATIENT
Start: 2021-03-12 | End: 2021-03-13 | Stop reason: HOSPADM

## 2021-03-12 RX ORDER — CEFAZOLIN SODIUM 2 G/100ML
2 INJECTION, SOLUTION INTRAVENOUS EVERY 8 HOURS
Status: DISCONTINUED | OUTPATIENT
Start: 2021-03-12 | End: 2021-03-12

## 2021-03-12 RX ORDER — ONDANSETRON 2 MG/ML
INJECTION INTRAMUSCULAR; INTRAVENOUS PRN
Status: DISCONTINUED | OUTPATIENT
Start: 2021-03-12 | End: 2021-03-12

## 2021-03-12 RX ORDER — MELOXICAM 15 MG/1
15 TABLET ORAL DAILY
Qty: 30 TABLET | Refills: 0 | Status: SHIPPED | OUTPATIENT
Start: 2021-03-12

## 2021-03-12 RX ORDER — ONDANSETRON 2 MG/ML
4 INJECTION INTRAMUSCULAR; INTRAVENOUS EVERY 30 MIN PRN
Status: DISCONTINUED | OUTPATIENT
Start: 2021-03-12 | End: 2021-03-12 | Stop reason: HOSPADM

## 2021-03-12 RX ORDER — ONDANSETRON 4 MG/1
4 TABLET, ORALLY DISINTEGRATING ORAL EVERY 30 MIN PRN
Status: DISCONTINUED | OUTPATIENT
Start: 2021-03-12 | End: 2021-03-12 | Stop reason: HOSPADM

## 2021-03-12 RX ORDER — DIPHENHYDRAMINE HCL 12.5MG/5ML
12.5 LIQUID (ML) ORAL EVERY 6 HOURS PRN
Status: DISCONTINUED | OUTPATIENT
Start: 2021-03-12 | End: 2021-03-13 | Stop reason: HOSPADM

## 2021-03-12 RX ORDER — LIDOCAINE 40 MG/G
CREAM TOPICAL
Status: DISCONTINUED | OUTPATIENT
Start: 2021-03-12 | End: 2021-03-13 | Stop reason: HOSPADM

## 2021-03-12 RX ORDER — HYDROMORPHONE HYDROCHLORIDE 1 MG/ML
0.5 INJECTION, SOLUTION INTRAMUSCULAR; INTRAVENOUS; SUBCUTANEOUS EVERY 30 MIN PRN
Status: DISCONTINUED | OUTPATIENT
Start: 2021-03-12 | End: 2021-03-13 | Stop reason: HOSPADM

## 2021-03-12 RX ORDER — MAGNESIUM HYDROXIDE 1200 MG/15ML
LIQUID ORAL PRN
Status: DISCONTINUED | OUTPATIENT
Start: 2021-03-12 | End: 2021-03-12 | Stop reason: HOSPADM

## 2021-03-12 RX ORDER — SODIUM CHLORIDE, SODIUM LACTATE, POTASSIUM CHLORIDE, CALCIUM CHLORIDE 600; 310; 30; 20 MG/100ML; MG/100ML; MG/100ML; MG/100ML
INJECTION, SOLUTION INTRAVENOUS CONTINUOUS
Status: DISCONTINUED | OUTPATIENT
Start: 2021-03-12 | End: 2021-03-13 | Stop reason: HOSPADM

## 2021-03-12 RX ORDER — HYDROXYZINE HYDROCHLORIDE 10 MG/1
10 TABLET, FILM COATED ORAL EVERY 6 HOURS PRN
Status: DISCONTINUED | OUTPATIENT
Start: 2021-03-12 | End: 2021-03-13 | Stop reason: HOSPADM

## 2021-03-12 RX ORDER — CELECOXIB 200 MG/1
400 CAPSULE ORAL ONCE
Status: COMPLETED | OUTPATIENT
Start: 2021-03-12 | End: 2021-03-12

## 2021-03-12 RX ORDER — PROCHLORPERAZINE MALEATE 5 MG
5 TABLET ORAL EVERY 6 HOURS PRN
Status: DISCONTINUED | OUTPATIENT
Start: 2021-03-12 | End: 2021-03-13 | Stop reason: HOSPADM

## 2021-03-12 RX ORDER — ACETAMINOPHEN 325 MG/1
975 TABLET ORAL EVERY 8 HOURS
Status: DISCONTINUED | OUTPATIENT
Start: 2021-03-12 | End: 2021-03-13 | Stop reason: HOSPADM

## 2021-03-12 RX ORDER — PROPOFOL 10 MG/ML
INJECTION, EMULSION INTRAVENOUS PRN
Status: DISCONTINUED | OUTPATIENT
Start: 2021-03-12 | End: 2021-03-12

## 2021-03-12 RX ORDER — ACETAMINOPHEN 325 MG/1
650 TABLET ORAL EVERY 4 HOURS PRN
Status: DISCONTINUED | OUTPATIENT
Start: 2021-03-15 | End: 2021-03-13 | Stop reason: HOSPADM

## 2021-03-12 RX ORDER — POLYETHYLENE GLYCOL 3350 17 G/17G
17 POWDER, FOR SOLUTION ORAL DAILY
Status: DISCONTINUED | OUTPATIENT
Start: 2021-03-13 | End: 2021-03-13 | Stop reason: HOSPADM

## 2021-03-12 RX ORDER — TIMOLOL MALEATE 5 MG/ML
1 SOLUTION/ DROPS OPHTHALMIC EVERY MORNING
Status: DISCONTINUED | OUTPATIENT
Start: 2021-03-13 | End: 2021-03-13 | Stop reason: HOSPADM

## 2021-03-12 RX ADMIN — CEFAZOLIN SODIUM 1 G: 2 INJECTION, SOLUTION INTRAVENOUS at 09:40

## 2021-03-12 RX ADMIN — PROPOFOL 125 MCG/KG/MIN: 10 INJECTION, EMULSION INTRAVENOUS at 07:33

## 2021-03-12 RX ADMIN — PROPOFOL 30 MG: 10 INJECTION, EMULSION INTRAVENOUS at 07:43

## 2021-03-12 RX ADMIN — PHENYLEPHRINE HYDROCHLORIDE 0.2 MCG/KG/MIN: 10 INJECTION INTRAVENOUS at 08:35

## 2021-03-12 RX ADMIN — CELECOXIB 100 MG: 100 CAPSULE ORAL at 18:07

## 2021-03-12 RX ADMIN — PHENYLEPHRINE HYDROCHLORIDE 100 MCG: 10 INJECTION INTRAVENOUS at 08:15

## 2021-03-12 RX ADMIN — FENTANYL CITRATE 50 MCG: 50 INJECTION, SOLUTION INTRAMUSCULAR; INTRAVENOUS at 07:42

## 2021-03-12 RX ADMIN — OXYCODONE HYDROCHLORIDE 5 MG: 5 TABLET ORAL at 16:05

## 2021-03-12 RX ADMIN — CEFAZOLIN SODIUM 2 G: 2 INJECTION, SOLUTION INTRAVENOUS at 07:43

## 2021-03-12 RX ADMIN — GLYCOPYRROLATE 0.2 MG: 0.2 INJECTION, SOLUTION INTRAMUSCULAR; INTRAVENOUS at 08:16

## 2021-03-12 RX ADMIN — OXYCODONE HYDROCHLORIDE 5 MG: 5 TABLET ORAL at 23:45

## 2021-03-12 RX ADMIN — MIDAZOLAM HYDROCHLORIDE 2 MG: 1 INJECTION, SOLUTION INTRAMUSCULAR; INTRAVENOUS at 07:11

## 2021-03-12 RX ADMIN — Medication 5 MG: at 07:59

## 2021-03-12 RX ADMIN — TRANEXAMIC ACID 1950 MG: 650 TABLET ORAL at 05:56

## 2021-03-12 RX ADMIN — CEFAZOLIN SODIUM 2 G: 2 INJECTION, SOLUTION INTRAVENOUS at 15:56

## 2021-03-12 RX ADMIN — ACETAMINOPHEN 975 MG: 325 TABLET, FILM COATED ORAL at 05:56

## 2021-03-12 RX ADMIN — CEFAZOLIN SODIUM 2 G: 2 INJECTION, SOLUTION INTRAVENOUS at 23:33

## 2021-03-12 RX ADMIN — SODIUM CHLORIDE, POTASSIUM CHLORIDE, SODIUM LACTATE AND CALCIUM CHLORIDE: 600; 310; 30; 20 INJECTION, SOLUTION INTRAVENOUS at 08:31

## 2021-03-12 RX ADMIN — SODIUM CHLORIDE, POTASSIUM CHLORIDE, SODIUM LACTATE AND CALCIUM CHLORIDE: 600; 310; 30; 20 INJECTION, SOLUTION INTRAVENOUS at 12:19

## 2021-03-12 RX ADMIN — DOCUSATE SODIUM 50 MG AND SENNOSIDES 8.6 MG 1 TABLET: 8.6; 5 TABLET, FILM COATED ORAL at 20:13

## 2021-03-12 RX ADMIN — BUPIVACAINE HYDROCHLORIDE IN DEXTROSE 13.5 MG: 7.5 INJECTION, SOLUTION SUBARACHNOID at 07:40

## 2021-03-12 RX ADMIN — MIDAZOLAM 1 MG: 1 INJECTION INTRAMUSCULAR; INTRAVENOUS at 07:42

## 2021-03-12 RX ADMIN — ACETAMINOPHEN 975 MG: 325 TABLET, FILM COATED ORAL at 13:40

## 2021-03-12 RX ADMIN — ACETAMINOPHEN 975 MG: 325 TABLET, FILM COATED ORAL at 20:13

## 2021-03-12 RX ADMIN — OXYCODONE HYDROCHLORIDE 10 MG: 10 TABLET, FILM COATED, EXTENDED RELEASE ORAL at 05:56

## 2021-03-12 RX ADMIN — CELECOXIB 400 MG: 200 CAPSULE ORAL at 05:56

## 2021-03-12 RX ADMIN — Medication 5 MG: at 07:58

## 2021-03-12 RX ADMIN — Medication 5 MG: at 08:11

## 2021-03-12 RX ADMIN — ASPIRIN 325 MG: 325 TABLET, COATED ORAL at 18:08

## 2021-03-12 RX ADMIN — SODIUM CHLORIDE, POTASSIUM CHLORIDE, SODIUM LACTATE AND CALCIUM CHLORIDE: 600; 310; 30; 20 INJECTION, SOLUTION INTRAVENOUS at 06:34

## 2021-03-12 RX ADMIN — DEXAMETHASONE SODIUM PHOSPHATE 4 MG: 4 INJECTION, SOLUTION INTRA-ARTICULAR; INTRALESIONAL; INTRAMUSCULAR; INTRAVENOUS; SOFT TISSUE at 07:53

## 2021-03-12 RX ADMIN — ONDANSETRON 4 MG: 2 INJECTION INTRAMUSCULAR; INTRAVENOUS at 09:42

## 2021-03-12 RX ADMIN — DOCUSATE SODIUM 100 MG: 100 CAPSULE, LIQUID FILLED ORAL at 20:13

## 2021-03-12 RX ADMIN — Medication 5 MG: at 08:02

## 2021-03-12 RX ADMIN — HYDROMORPHONE HYDROCHLORIDE 0.3 MG: 1 INJECTION, SOLUTION INTRAMUSCULAR; INTRAVENOUS; SUBCUTANEOUS at 13:40

## 2021-03-12 RX ADMIN — OXYCODONE HYDROCHLORIDE 5 MG: 5 TABLET ORAL at 20:13

## 2021-03-12 RX ADMIN — LATANOPROST 1 DROP: 50 SOLUTION/ DROPS OPHTHALMIC at 21:41

## 2021-03-12 RX ADMIN — Medication 5 MG: at 07:52

## 2021-03-12 RX ADMIN — PHENYLEPHRINE HYDROCHLORIDE 50 MCG: 10 INJECTION INTRAVENOUS at 08:35

## 2021-03-12 ASSESSMENT — LIFESTYLE VARIABLES: TOBACCO_USE: 1

## 2021-03-12 ASSESSMENT — MIFFLIN-ST. JEOR: SCORE: 1878.02

## 2021-03-12 NOTE — BRIEF OP NOTE
[facility]    Brief Operative Note    Pre-operative diagnosis: Osteoarthritis of right knee, unspecified osteoarthritis type [M17.11]  Post-operative diagnosis Same as pre-operative diagnosis    Procedure: Procedure(s):  RIGHT TOTAL KNEE ARTHROPLASTY  Surgeon: Surgeon(s) and Role:     * Nadir Murillo MD - Primary     * Ramiro Graham PA-C - Assisting  Anesthesia: General   Estimated blood loss: Less than 50 ml  Drains: None  Specimens: * No specimens in log *  Findings:   None.  Complications: None.  Implants:   Implant Name Type Inv. Item Serial No.  Lot No. LRB No. Used Action   BONE CEMENT RADIOPAQUE SIMPLEX HV FULL DOSE 6194-1-001 Cement, Bone BONE CEMENT RADIOPAQUE SIMPLEX HV FULL DOSE 6194-1-001  [employer] [id number] Right 2 Implanted   BIOMET VANGUARD ARCOM PATELLA 34MM X 8.5MM    BIOMET [id number] Right 1 Implanted   BIOMET FIXED I-BEAM TIBIAL PLATE CEMENTED 75MM    BIOMET [id number] Right 1 Implanted   BIOMET VANGUARD FEMORAL - RIGHT 67.5    BIOMET [id number] Right 1 Implanted   IMP INSERT BASEPLATE TIBIAL BIOM 10X71/75 [id number] Total Joint Component/Insert IMP INSERT BASEPLATE TIBIAL BIOM 10X71/75 [id number]  [employer] [id number] Right 1 Implanted

## 2021-03-12 NOTE — ANESTHESIA PREPROCEDURE EVALUATION
Anesthesia Pre-Procedure Evaluation    Patient: Donny Cleveland   MRN: [id number] : 1950        Preoperative Diagnosis: Osteoarthritis of right knee, unspecified osteoarthritis type [M17.11]   Procedure : Procedure(s):  RIGHT TOTAL KNEE ARTHROPLASTY     Past Medical History:   Diagnosis Date     Ascending aorta dilatation (H)      Myopia, right      Other chronic pain     left ankle     Pain in right knee      Post-traumatic osteoarthritis      Presbyopia      Uncomplicated asthma     as child      Past Surgical History:   Procedure Laterality Date     ARTHROPLASTY ANKLE Left 2017    Procedure: ARTHROPLASTY ANKLE;  Surgeon: Lorenzo Cope MD;  Location: [facility] OR     ARTHROPLASTY ANKLE Right 2017    Procedure: ARTHROPLASTY ANKLE;  1) Total ankle arthroplasty, right ankle  2) Gastrocnemius contracture, right ankle  3) Open lateral ligament reconstruction, right ankle;  Surgeon: Lorenzo Cope MD;  Location: [facility] OR     ARTHROSCOPY KNEE WITH LATERAL MENISCECTOMY Right 2015    Procedure: ARTHROSCOPY KNEE WITH LATERAL MENISCECTOMY;  Surgeon: Lorenzo Cope MD;  Location:  OR     ORTHOPEDIC SURGERY      bilateral ankle surgery     RECESSION GASTROCNEMIUS Right 2017    Procedure: RECESSION GASTROCNEMIUS;;  Surgeon: Lorenzo Cope MD;  Location: [facility] OR     REMOVE HARDWARE ANKLE Right 2017    Procedure: REMOVE HARDWARE ANKLE;;  Surgeon: Lorenzo Cope MD;  Location: [facility] OR     ROTATOR CUFF REPAIR RT/LT Right      TONSILLECTOMY        Allergies   Allergen Reactions     Cats Other (See Comments)     hayfever symptoms      Social History     Tobacco Use     Smoking status: Former Smoker     Types: Cigarettes     Quit date: 2017     Years since quitting: 3.3     Tobacco comment: quit 30 yrs ago   Substance Use Topics     Alcohol use: Yes     Comment: 3 - 4 weekly      Wt Readings from Last 1 Encounters:   21 107.5 kg (237 lb)         Anesthesia Evaluation   Pt has had prior anesthetic. Type: General.    No history of anesthetic complications       ROS/MED HX  ENT/Pulmonary:     (+) tobacco use, Past use, asthma     Neurologic:       Cardiovascular: Comment: Ascending aortic dilation    (+) -----Previous cardiac testing   Echo: Date: Results:    Stress Test: Date: Results:    ECG Reviewed: Date: 11/3/17 Results:  Inverted T waves in inferior leads  Cath: Date: Results:      METS/Exercise Tolerance:     Hematologic:       Musculoskeletal:   (+) arthritis,     GI/Hepatic:       Renal/Genitourinary:       Endo:     (+) Obesity (Class 1),     Psychiatric/Substance Use:       Infectious Disease:       Malignancy:       Other:      (+) , H/O Chronic Pain,           OUTSIDE LABS:  CBC:   Lab Results   Component Value Date    HGB 13.5 11/14/2017    HGB 13.3 02/28/2017     BMP:   Lab Results   Component Value Date     (H) 02/28/2017     COAGS: No results found for: PTT, INR, FIBR  POC: No results found for: BGM, HCG, HCGS  HEPATIC: No results found for: ALBUMIN, PROTTOTAL, ALT, AST, GGT, ALKPHOS, BILITOTAL, BILIDIRECT, SKYE  OTHER: No results found for: PH, LACT, A1C, JESUS, PHOS, MAG, LIPASE, AMYLASE, TSH, T4, T3, CRP, SED    Anesthesia Plan    ASA Status:  2      Anesthesia Type: Spinal.              Consents    Anesthesia Plan(s) and associated risks, benefits, and realistic alternatives discussed. Questions answered and patient/representative(s) expressed understanding.     - Discussed with:  Patient         Postoperative Care    Pain management: IV analgesics, Multi-modal analgesia, Peripheral nerve block (Single Shot).   PONV prophylaxis: Ondansetron (or other 5HT-3)     Comments:                Ford Sprague MD

## 2021-03-12 NOTE — ANESTHESIA PROCEDURE NOTES
Pre-Procedure   Staff -   Anesthesiologist:  Ford Sprague MD  Performed By: anesthesiologist  Location: pre-op  Pre-Anesthestic Checklist: patient identified, IV checked, site marked, risks and benefits discussed, informed consent, monitors and equipment checked, pre-op evaluation, at physician/surgeon's request and post-op pain management  Timeout:  Correct Patient: Yes   Correct Procedure: Yes   Correct Site: Yes   Correct Position: Yes   Correct Laterality: Yes   Site Marked: Yes    Procedure Documentation  Procedure: Adductor canal (targeting saphenous nerve)  Patient Position:supine  Patient Prep/Sterile Barriers: Chloraprep  Local skin infiltrated with 1 mL of 1% lidocaine.   Needle type: insulated  Needle Gauge: 21.   Needle Length (millimeters) 90   Ultrasound guided, Ultrasound used to identify targeted nerve, plexus, vascular marker, or fascial plane and place a needle adjacent to it in real-time, Ultrasound was used to visualize the spread of anesthetic in close proximity to the above referenced structure  A permanent image is entered into the patient's record.    Assessment/Narrative      The placement was negative for: blood aspirated, painful injection and site bleeding  Paresthesias: No.  Bolus given via needle..   Secured via.   Insertion/Infusion Method: Single Shot  Complications: none  Comments:  Bolus via needle, 15 ml of 0.5% ropivacaine with 1:400,000 epinephrine.    Under ultrasound guidance, a 21 gauge needle was inserted and placed in close proximity to the saphenous nerve. Ultrasound was also used to visualize the spread of anesthetic in close proximity to the nerve being blocked. The nerve appeared anatomically normal, and there were no apparent abnormal pathological findings. Patient tolerated well, was mildly sedated but communicative throughout the procedure. A permanent ultrasound image was saved in the patient's record.    The surgeon has given a verbal order transferring  care of this patient to me for the performance of regional analgesia block for post op pain control. It is requested of me because I am uniquely trained and qualified to perform this block and the surgeon is neither trained nor qualified to perform this procedure.

## 2021-03-12 NOTE — PLAN OF CARE
A&O, VSS on RA, Dilaudid x 1 and scheduled Tylenol given, for pain level 2, DTV, due to be off loaded, on regular diet, tolerating it well. Dressing CDI.

## 2021-03-12 NOTE — PROGRESS NOTES
03/12/21 1700   Quick Adds   Quick Adds Certification   Type of Visit Initial PT Evaluation   Living Environment   People in home spouse   Current Living Arrangements house   Home Accessibility stairs to enter home   Number of Stairs, Main Entrance 2   Stair Railings, Main Entrance none   Transportation Anticipated family or friend will provide   Living Environment Comments Patient has a FWW and cane at home.    Self-Care   Usual Activity Tolerance good   Current Activity Tolerance good   Equipment Currently Used at Home none   Activity/Exercise/Self-Care Comment Independent with all mobility and ADLs at baseline   Disability/Function   Hearing Difficulty or Deaf no   Wear Glasses or Blind no   Concentrating, Remembering or Making Decisions Difficulty no   Difficulty Communicating no   Difficulty Eating/Swallowing no   Fall history within last six months no   General Information   Onset of Illness/Injury or Date of Surgery 03/12/21   Referring Physician Dr. Murillo   Patient/Family Therapy Goals Statement (PT) to go home tomorrow   Pertinent History of Current Problem (include personal factors and/or comorbidities that impact the POC) Patient is 71 YO M POD 0 s/p R TKA. PMH: B TAA, obesity, R rotator cuff repair   Existing Precautions/Restrictions fall   Weight-Bearing Status - LUE full weight-bearing   Weight-Bearing Status - RUE full weight-bearing   Weight-Bearing Status - LLE full weight-bearing   Weight-Bearing Status - RLE weight-bearing as tolerated   General Observations Activity orders: ambulate with assist   Cognition   Orientation Status (Cognition) oriented x 4   Affect/Mental Status (Cognition) WNL   Follows Commands (Cognition) WNL   Pain Assessment   Patient Currently in Pain Yes, see Vital Sign flowsheet  (2/10 R knee)   Integumentary/Edema   Integumentary/Edema Comments R LE wrapped in ACE bandage   Posture    Posture Not impaired   Range of Motion (ROM)   ROM Comment R knee AAROM 2-79 degrees    Strength   Manual Muscle Testing Quick Adds Able to perform R SLR   Strength Comments R knee extension 3-/5   Bed Mobility   Bed Mobility supine-sit;sit-supine   Supine-Sit Dougherty (Bed Mobility) independent   Sit-Supine Dougherty (Bed Mobility) independent   Transfers   Transfers sit-stand transfer   Sit-Stand Transfer   Sit-Stand Dougherty (Transfers) supervision;verbal cues   Assistive Device (Sit-Stand Transfers) walker, front-wheeled   Gait/Stairs (Locomotion)   Dougherty Level (Gait) contact guard   Assistive Device (Gait) walker, front-wheeled   Distance in Feet (Required for LE Total Joints) 10' (eval) + 160 + 20' x 2   Pattern (Gait) step-through   Deviations/Abnormal Patterns (Gait) right sided deviations   Right Sided Gait Deviations heel strike decreased  (R knee locked into extension)   Balance   Balance Comments Good sitting balance; Able to maintain standing balance at toilet   Sensory Examination   Sensory Perception WNL   Coordination   Coordination no deficits were identified   Clinical Impression   Criteria for Skilled Therapeutic Intervention yes, treatment indicated   PT Diagnosis (PT) impaired mobility   Influenced by the following impairments R knee pain, decreased ROM, muscle weakness   Functional limitations due to impairments increased difficulty with bed mobility, transfers and ambulation   Clinical Presentation Stable/Uncomplicated   Clinical Presentation Rationale controlled pain, stable vitals   Clinical Decision Making (Complexity) low complexity   Therapy Frequency (PT) Daily   Predicted Duration of Therapy Intervention (days/wks) 2 days   Planned Therapy Interventions (PT) cryotherapy;gait training;home exercise program;patient/family education;ROM (range of motion);stair training;strengthening;transfer training   Risk & Benefits of therapy have been explained evaluation/treatment results reviewed;care plan/treatment goals reviewed;risks/benefits  reviewed;current/potential barriers reviewed;participants voiced agreement with care plan;participants included;patient   PT Discharge Planning    PT Rationale for DC Rec Anticipate patient will be SBA for transfers and gait; min assist +1 for the 2 steps to enter   Therapy Certification   Start of care date 03/12/21   Certification date from 03/12/21   Certification date to 03/14/21   Medical Diagnosis R TKR   Total Evaluation Time   Total Evaluation Time (Minutes) 9

## 2021-03-12 NOTE — PROGRESS NOTES
[unfilled]                                                                              [facility]      OUTPATIENT PHYSICAL THERAPY EVALUATION  PLAN OF TREATMENT FOR OUTPATIENT REHABILITATION  (COMPLETE FOR INITIAL CLAIMS ONLY)  Patient's Last Name, First Name, M.I.  YOB: 1950  Donny Cleveland                        Provider's Name  [facility] Medical Record No.  [id number]                               Onset Date:  03/12/21   Start of Care Date:  03/12/21      Type:     _X_PT   ___OT   ___SLP Medical Diagnosis:  R TKR                        PT Diagnosis:  impaired mobility   Visits from SOC:  1   _________________________________________________________________________________  Plan of Treatment/Functional Goals    Planned Interventions: cryotherapy, gait training, home exercise program, patient/family education, ROM (range of motion), stair training, strengthening, transfer training     Goals: See Physical Therapy Goals on Care Plan in [employer] electronic health record.    Therapy Frequency: Daily  Predicted Duration of Therapy Intervention: 2 days  _________________________________________________________________________________    I CERTIFY THE NEED FOR THESE SERVICES FURNISHED UNDER        THIS PLAN OF TREATMENT AND WHILE UNDER MY CARE     (Physician co-signature of this document indicates review and certification of the therapy plan).                Certification date from: 03/12/21, Certification date to: 03/14/21    Referring Physician: Dr. Murillo            Initial Assessment        See Physical Therapy evaluation dated 03/12/21 in [employer] electronic health record.

## 2021-03-12 NOTE — ANESTHESIA POSTPROCEDURE EVALUATION
Patient: Donny Cleveland    Procedure(s):  RIGHT TOTAL KNEE ARTHROPLASTY    Diagnosis:Osteoarthritis of right knee, unspecified osteoarthritis type [M17.11]  Diagnosis Additional Information: No value filed.    Anesthesia Type:  Spinal    Note:     Postop Pain Control: Uneventful            Sign Out: Well controlled pain   PONV: No   Neuro/Psych: Uneventful            Sign Out: Acceptable/Baseline neuro status   Airway/Respiratory: Uneventful            Sign Out: Acceptable/Baseline resp. status   CV/Hemodynamics: Uneventful            Sign Out: Acceptable CV status   Other NRE: NONE   DID A NON-ROUTINE EVENT OCCUR? No         Last vitals:  Vitals:    03/12/21 1130 03/12/21 1200 03/12/21 1300   BP: 109/82 125/88 126/80   Pulse: 57 55 60   Resp: 11 12 11   Temp:      SpO2: 100% 97% 98%       Last vitals prior to Anesthesia Care Transfer:  CRNA VITALS  3/12/2021 0934 - 3/12/2021 1034      3/12/2021             Resp Rate (observed):  16    EKG:  Sinus rhythm          Electronically Signed By: Ford Sprague MD  March 12, 2021  1:58 PM

## 2021-03-12 NOTE — PROGRESS NOTES
Patient's belongings returned. Denies need to wear glasses currently. Labeled and in bag loaded on cart. Patient denies all pain. Pleasant. Xray complete, MDA at bedside for sign out.

## 2021-03-12 NOTE — ANESTHESIA CARE TRANSFER NOTE
Patient: Donny Cleveland    Procedure(s):  RIGHT TOTAL KNEE ARTHROPLASTY    Diagnosis: Osteoarthritis of right knee, unspecified osteoarthritis type [M17.11]  Diagnosis Additional Information: No value filed.    Anesthesia Type:   Spinal     Note:    Oropharynx: oropharynx clear of all foreign objects  Level of Consciousness: awake  Oxygen Supplementation: face mask  Level of Supplemental Oxygen (L/min / FiO2): 8  Independent Airway: airway patency satisfactory and stable  Dentition: dentition unchanged  Vital Signs Stable: post-procedure vital signs reviewed and stable  Report to RN Given: handoff report given  Patient transferred to: PACU    Handoff Report: Identifed the Patient, Identified the Reponsible Provider, Reviewed the pertinent medical history, Discussed the surgical course, Reviewed Intra-OP anesthesia mangement and issues during anesthesia, Set expectations for post-procedure period and Allowed opportunity for questions and acknowledgement of understanding      Vitals: (Last set prior to Anesthesia Care Transfer)  CRNA VITALS  3/12/2021 0934 - 3/12/2021 1009      3/12/2021             Resp Rate (observed):  16    EKG:  Sinus rhythm        Electronically Signed By: SHARON Delgado CRNA  March 12, 2021  10:09 AM

## 2021-03-12 NOTE — ANESTHESIA PROCEDURE NOTES
Pre-Procedure   Staff -   Anesthesiologist:  Ford Sprague MD  Performed By: anesthesiologist  Location: OR  Pre-Anesthestic Checklist: patient identified, IV checked, site marked, risks and benefits discussed, informed consent, monitors and equipment checked, pre-op evaluation and at physician/surgeon's request  Timeout:  Correct Patient: Yes   Correct Procedure: Yes   Correct Site: Yes   Correct Position: Yes   Correct Laterality: Yes   Site Marked: Yes    Procedure Documentation  Procedure: intrathecal  Patient Position:sitting  Patient Prep/Sterile Barriers: Betadine  Insertion Site: L2-3. (midline approach).  Spinal Needle (gauge): 25   Spinal/LP Needle Length (inches): 3  Spinal Needle Type: Sravanthi tipIntroducer used  Introducer: 20 G  # of attempts: 1 and # of redirects:     Assessment/Narrative      Paresthesias: No.  CSF fluid: clear.  Opening pressure was cmH2O while sitting.      Comments:  Patient sitting on edge of OR bed, lower back cleaned and prepped in sterile fashion with betadine. 1% lido used to numb area. Introducer placed, spinal needle through introducer. Appropriate flow of CSF and confirmed with aspiration via syringe. Spinal dose given, 13.5 mg 0.75% bupivacaine. No complications.

## 2021-03-12 NOTE — OP NOTE
Procedure Date: 03/12/2021      PREOPERATIVE DIAGNOSIS:  Right knee osteoarthritis.      POSTOPERATIVE DIAGNOSIS:  Right knee osteoarthritis.      PROCEDURE PERFORMED:  Right total knee arthroplasty.      SURGEON:  Ndair Murillo MD      ASSISTANT:  Ramiro Graham PA-C      ANESTHESIA:  Regional.      COMPLICATIONS:  None.      OPERATIVE COURSE:  Mr. Cleveland was brought to the operating room.  An anesthetic was administered.  He received prophylactic antibiotics.  These will be discontinued within 24 hours of surgery.  He will be on aspirin for DVT prophylaxis.  His right lower extremity was prepped and draped in the usual sterile fashion and timeout was called.        The limb was exsanguinated and the tourniquet inflated.  I made a sharp incision from just medial to the tibial tubercle to the superior pole of the patella,  sharply through the skin and subcutaneous.  Gained the extensor mechanism.  Made a medial arthrotomy.  Inspection of the joint revealed full-thickness chondral loss in the flexion gap of the lateral femoral condyle in the mid aspect of the tibial plateau.      Measured my patella to 23, cut it to a 14, prepared it for a 34 button.      I placed my intramedullary guide in the femur.  We used a 6-degree valgus bushing.  I took an additional millimeter, as I am using a PS component.  I made my 5-in-1 box cuts for a 67.5 Vanguard posterior cruciate substituting component.      I then set the tibial extramedullary cutting guide in the long axis of the tibia.  I took 4 mm from the medial tibial plateau, which was essentially normal.  I sized it for 75 baseplate.      I then infiltrated the posterior capsule with the analgesic cocktail, did so medially and laterally as well including the periosteum of the femur.      I then went ahead and cemented everything in place with a 12.  During cementation, I copiously irrigated and soaked for a total of 3 minutes with a dilute Betadine solution.      I then  went ahead and trialed a 12 and liked the 10 better, it gave a little lateral laxity, which I liked.  There was no lateral release was needed.  Placed my final components.  Closed the extensor mechanism with Ethibond, 2-0 in the subcu, staples in the skin.  Bulky sterile dressing was applied.  He returned to the recovery room in stable condition.  All sponge and needle counts were correct at the end of the procedure.  There were no known complications.         NATALIA ROMERO MD             D: 2021   T: 2021   MT:       Name:     JULIANA RODRIGEZ   MRN:      [id number]        Account:        [id number]   :      1950           Procedure Date: 2021      Document: [id number]

## 2021-03-13 ENCOUNTER — APPOINTMENT (OUTPATIENT)
Dept: PHYSICAL THERAPY | Facility: CLINIC | Age: 71
End: 2021-03-13
Attending: ORTHOPAEDIC SURGERY
Payer: MEDICARE

## 2021-03-13 ENCOUNTER — HEALTH MAINTENANCE LETTER (OUTPATIENT)
Age: 71
End: 2021-03-13

## 2021-03-13 VITALS
TEMPERATURE: 98.1 F | RESPIRATION RATE: 16 BRPM | HEART RATE: 65 BPM | HEIGHT: 72 IN | OXYGEN SATURATION: 96 % | BODY MASS INDEX: 32.1 KG/M2 | WEIGHT: 237 LBS | SYSTOLIC BLOOD PRESSURE: 114 MMHG | DIASTOLIC BLOOD PRESSURE: 73 MMHG

## 2021-03-13 LAB — HGB BLD-MCNC: 11.8 G/DL (ref 13.3–17.7)

## 2021-03-13 PROCEDURE — 999N000111 HC STATISTIC OT IP EVAL DEFER: Performed by: REHABILITATION PRACTITIONER

## 2021-03-13 PROCEDURE — 97116 GAIT TRAINING THERAPY: CPT | Mod: GP | Performed by: PHYSICAL THERAPIST

## 2021-03-13 PROCEDURE — 85018 HEMOGLOBIN: CPT | Performed by: ORTHOPAEDIC SURGERY

## 2021-03-13 PROCEDURE — 250N000013 HC RX MED GY IP 250 OP 250 PS 637: Mod: GY | Performed by: ORTHOPAEDIC SURGERY

## 2021-03-13 PROCEDURE — 36415 COLL VENOUS BLD VENIPUNCTURE: CPT | Performed by: ORTHOPAEDIC SURGERY

## 2021-03-13 RX ORDER — OXYCODONE HYDROCHLORIDE 5 MG/1
5-10 TABLET ORAL
Qty: 60 TABLET | Refills: 0 | Status: SHIPPED | OUTPATIENT
Start: 2021-03-13

## 2021-03-13 RX ADMIN — OXYCODONE HYDROCHLORIDE 10 MG: 5 TABLET ORAL at 07:56

## 2021-03-13 RX ADMIN — ASPIRIN 325 MG: 325 TABLET, COATED ORAL at 08:00

## 2021-03-13 RX ADMIN — ACETAMINOPHEN 975 MG: 325 TABLET, FILM COATED ORAL at 05:33

## 2021-03-13 RX ADMIN — CELECOXIB 100 MG: 100 CAPSULE ORAL at 05:33

## 2021-03-13 RX ADMIN — DOCUSATE SODIUM 100 MG: 100 CAPSULE, LIQUID FILLED ORAL at 08:00

## 2021-03-13 RX ADMIN — TIMOLOL MALEATE 1 DROP: 5 SOLUTION/ DROPS OPHTHALMIC at 08:00

## 2021-03-13 RX ADMIN — ALUMINUM HYDROXIDE, MAGNESIUM HYDROXIDE, AND SIMETHICONE 30 ML: 200; 200; 20 SUSPENSION ORAL at 03:36

## 2021-03-13 RX ADMIN — DOCUSATE SODIUM 50 MG AND SENNOSIDES 8.6 MG 1 TABLET: 8.6; 5 TABLET, FILM COATED ORAL at 08:00

## 2021-03-13 RX ADMIN — OXYCODONE HYDROCHLORIDE 5 MG: 5 TABLET ORAL at 03:33

## 2021-03-13 NOTE — PLAN OF CARE
Summary:     Date/Time 3/112/21 0730    Service: Ortho  Behavior/Aggression tool color: green  Diagnosis: R TKA  POD#: 1  Mental Status: A/O x4  Activity/dangle: Up x 1 A1GBW  Diet: regular  Pain: 1-4/10; oxycodone/tylenol/ ice/celebrex  [place]/Voiding: per urinal  Tele/Restraints/Iso: NA  02/LDA: RA  D/C Date: POD1  Other Info: VSS, A/O x 4. R knee CDI, CMS intact. +BS, +flatus, Last BM 3/11. Up 1AGBW, Incision CDI

## 2021-03-13 NOTE — PLAN OF CARE
Reviewed discharge instructions and medications with patient. Questions answered. Patient discharged to home with spouse, discharge instructions, medications, and belongings at this time.     All belongings returned and verified per pink sheet

## 2021-03-13 NOTE — PLAN OF CARE
Physical Therapy Discharge Summary    Reason for therapy discharge:    Discharged to home with outpatient therapy.    Progress towards therapy goal(s). See goals on Care Plan in [facility] electronic health record for goal details.  Goals met    Therapy recommendation(s):    Continued therapy is recommended.  Rationale/Recommendations:  OP PT recommended to increase strength in R LE, increase safety with independence and functional mobility.

## 2021-03-13 NOTE — PROGRESS NOTES
Donny Cleveland  3/13/2021  POD # 1    Doing well.  Normal healing wound.  No immediate surgical complications identified.  No excessive bleeding  Pain well-controlled.  Tolerating physical therapy and rehabilitation well.  Objective:  Blood pressure 114/73, pulse 65, temperature 98.1  F (36.7  C), temperature source Oral, resp. rate 16, height 1.829 m (6'), weight 107.5 kg (237 lb), SpO2 96 %.    Temperatures:  Current - Temp: 98.1  F (36.7  C); Max - Temp  Av.7  F (36.5  C)  Min: 97.2  F (36.2  C)  Max: 98.1  F (36.7  C)  Pulse range: Pulse  Av  Min: 55  Max: 79  Blood pressure range: Systolic (24hrs), Av , Min:107 , Max:135   ; Diastolic (24hrs), Av, Min:69, Max:88    Exam:  CMS: intact  alert, stable, dressing dry      Labs:  No results for input(s): POTASSIUM in the last 13911 hours.  Recent Labs   Lab Test 21  0659 17  0730 17  0603   HGB 11.8* 13.5 13.3     No results for input(s): INR in the last 30894 hours.  No results for input(s): PLT in the last 89858 hours.    PLAN:  Discharge today

## 2021-03-13 NOTE — PROGRESS NOTES
Patient vital signs are at baseline: Yes  Patient able to ambulate as they were prior to admission or with assist devices provided by therapies during their stay:  Yes  Patient MUST void prior to discharge:  Yes  Patient able to tolerate oral intake:  Yes  Pain has adequate pain control using Oral analgesics:  Yes     Pt is A & O x 4. Lungs sound clear, bowel sounds active, cms intact. Up with 1 and walker. Dressing is CDI. Received oxycodone for pain. Will continue to monitor.

## 2021-10-23 ENCOUNTER — HEALTH MAINTENANCE LETTER (OUTPATIENT)
Age: 71
End: 2021-10-23

## 2022-04-09 ENCOUNTER — HEALTH MAINTENANCE LETTER (OUTPATIENT)
Age: 72
End: 2022-04-09

## 2022-10-10 ENCOUNTER — HEALTH MAINTENANCE LETTER (OUTPATIENT)
Age: 72
End: 2022-10-10

## 2023-05-27 ENCOUNTER — HEALTH MAINTENANCE LETTER (OUTPATIENT)
Age: 73
End: 2023-05-27

## (undated) DEVICE — DRSG GAUZE 4X4" TRAY

## (undated) DEVICE — WRAP EZY KNEE

## (undated) DEVICE — LINEN DRAPE 54X72" 5467

## (undated) DEVICE — PACK LOWER EXTREMITY RIDGES

## (undated) DEVICE — SU ETHILON 4-0 PS-2 18" BLACK 1667H

## (undated) DEVICE — CAST PADDING 4" COTTON WEBRIL UNSTERILE 9084

## (undated) DEVICE — DRAPE STERI U 1015

## (undated) DEVICE — LINEN ORTHO PACK 5446

## (undated) DEVICE — DRSG ABDOMINAL 07 1/2X8" 7197D

## (undated) DEVICE — CAST PADDING 6" STERILE 9046S

## (undated) DEVICE — DRSG XEROFORM 5X9" 8884431605

## (undated) DEVICE — GLOVE PROTEXIS W/NEU-THERA 8.0  2D73TE80

## (undated) DEVICE — SU VICRYL 2-0 CT-2 27" UND J269H

## (undated) DEVICE — SOL NACL 0.9% INJ 1000ML BAG 2B1324X

## (undated) DEVICE — Device

## (undated) DEVICE — DRAPE C-ARM 60X42" 1013

## (undated) DEVICE — ESU GROUND PAD ADULT W/CORD E7507

## (undated) DEVICE — PACK SET-UP STD 9102

## (undated) DEVICE — DRAPE C-ARMOR 5 SIDED 5523

## (undated) DEVICE — CAST FIBERGLASS 3" ROLL WHITE 73458-00002-00

## (undated) DEVICE — BONE WAX 2.5GM W31G

## (undated) DEVICE — PACK TOTAL KNEE SOP15TKFSD

## (undated) DEVICE — CAST PADDING 4" STERILE 9044S

## (undated) DEVICE — LINEN HALF SHEET 5512

## (undated) DEVICE — BLADE KNIFE SURG 15 371115

## (undated) DEVICE — GLOVE PROTEXIS POWDER FREE 7.5 ORTHOPEDIC 2D73ET75

## (undated) DEVICE — SUCTION IRR SYSTEM W/O TIP INTERPULSE HANDPIECE 0210-100-000

## (undated) DEVICE — SU VICRYL 0 CT-1 27" J340H

## (undated) DEVICE — DRSG ADAPTIC 3X3" 6112

## (undated) DEVICE — LINEN FULL SHEET 5511

## (undated) DEVICE — BLADE SAW SAGITTAL STRK 13X90X1.27MM HD SYS 6 6113-127-090

## (undated) DEVICE — HOOD FLYTE W/PEELAWAY 408-800-100

## (undated) DEVICE — ESU GROUND PAD UNIVERSAL W/O CORD

## (undated) DEVICE — BLADE KNIFE SURG 10 371110

## (undated) DEVICE — PREP CHLORAPREP 26ML TINTED ORANGE  260815

## (undated) DEVICE — LINEN TOWEL PACK X5 5464

## (undated) DEVICE — SUCTION CANISTER STRAW 65652-008

## (undated) DEVICE — GLOVE PROTEXIS BLUE W/NEU-THERA 8.0  2D73EB80

## (undated) DEVICE — SU PDS II 1 CT MONOFIL Z353H

## (undated) DEVICE — BONE CEMENT MIXEVAC III HI VAC KIT  0206-015-000

## (undated) DEVICE — BLADE SAW SAGITTAL STRK 18X90X1.37MM HD SYS 6 6118-137-090

## (undated) DEVICE — DRAPE X-RAY TUBE 00-901169-01-OEC

## (undated) DEVICE — CAST PADDING 4" UNSTERILE 9044

## (undated) DEVICE — SOL NACL 0.9% IRRIG 1000ML BOTTLE 2F7124

## (undated) DEVICE — SYR 30ML LL W/O NDL 302832

## (undated) DEVICE — TOURNIQUET CUFF 30" REPRO BLUE 60-7070-105

## (undated) DEVICE — BAG CLEAR TRASH 1.3M 39X33" P4040C

## (undated) DEVICE — NDL 22GA 1" 305155

## (undated) DEVICE — CAST BUCKET

## (undated) DEVICE — SUCTION TIP YANKAUER STR K87

## (undated) DEVICE — DRAPE SHEET REV FOLD 3/4 9349

## (undated) DEVICE — SUCTION TIP YANKAUER W/O VENT K86

## (undated) DEVICE — SUCTION CANISTER MEDIVAC LINER 3000ML W/LID 65651-530

## (undated) DEVICE — SOLUTION WOUND CLEANSING 3/4OZ 10% PVP EA-L3011FB-50

## (undated) DEVICE — CAST PADDING 6" UNSTERILE 9046

## (undated) DEVICE — BNDG ELASTIC 4"X5YDS UNSTERILE 6611-40

## (undated) DEVICE — GLOVE PROTEXIS POWDER FREE 8.0 ORTHOPEDIC 2D73ET80

## (undated) DEVICE — SOL WATER IRRIG 1000ML BOTTLE 2F7114

## (undated) DEVICE — BONE CLEANING TIP INTERPULSE  0210-010-000

## (undated) DEVICE — GLOVE PROTEXIS W/NEU-THERA 7.5  2D73TE75

## (undated) DEVICE — MANIFOLD NEPTUNE 4 PORT 700-20

## (undated) DEVICE — SU VICRYL 2-0 CT-1 27" UND J259H

## (undated) DEVICE — SU ETHIBOND 1 CT-1 30" X425H

## (undated) DEVICE — DRSG AQUACEL AG 3.5X9.75" HYDROFIBER 412011

## (undated) RX ORDER — CEFAZOLIN SODIUM 2 G/100ML
INJECTION, SOLUTION INTRAVENOUS
Status: DISPENSED
Start: 2017-02-27

## (undated) RX ORDER — PROPOFOL 10 MG/ML
INJECTION, EMULSION INTRAVENOUS
Status: DISPENSED
Start: 2017-02-27

## (undated) RX ORDER — FENTANYL CITRATE 50 UG/ML
INJECTION, SOLUTION INTRAMUSCULAR; INTRAVENOUS
Status: DISPENSED
Start: 2017-11-13

## (undated) RX ORDER — LIDOCAINE HYDROCHLORIDE 10 MG/ML
INJECTION, SOLUTION EPIDURAL; INFILTRATION; INTRACAUDAL; PERINEURAL
Status: DISPENSED
Start: 2017-02-27

## (undated) RX ORDER — PROPOFOL 10 MG/ML
INJECTION, EMULSION INTRAVENOUS
Status: DISPENSED
Start: 2017-11-13

## (undated) RX ORDER — PROPOFOL 10 MG/ML
INJECTION, EMULSION INTRAVENOUS
Status: DISPENSED
Start: 2021-03-12

## (undated) RX ORDER — CEFAZOLIN SODIUM 1 G/3ML
INJECTION, POWDER, FOR SOLUTION INTRAMUSCULAR; INTRAVENOUS
Status: DISPENSED
Start: 2021-03-12

## (undated) RX ORDER — ONDANSETRON 2 MG/ML
INJECTION INTRAMUSCULAR; INTRAVENOUS
Status: DISPENSED
Start: 2017-02-27

## (undated) RX ORDER — OXYCODONE HYDROCHLORIDE 5 MG/1
TABLET ORAL
Status: DISPENSED
Start: 2017-11-13

## (undated) RX ORDER — FENTANYL CITRATE 50 UG/ML
INJECTION, SOLUTION INTRAMUSCULAR; INTRAVENOUS
Status: DISPENSED
Start: 2021-03-12

## (undated) RX ORDER — OXYCODONE HCL 10 MG/1
TABLET, FILM COATED, EXTENDED RELEASE ORAL
Status: DISPENSED
Start: 2021-03-12

## (undated) RX ORDER — TRIAMCINOLONE ACETONIDE 40 MG/ML
INJECTION, SUSPENSION INTRA-ARTICULAR; INTRAMUSCULAR
Status: DISPENSED
Start: 2017-02-27

## (undated) RX ORDER — ONDANSETRON 2 MG/ML
INJECTION INTRAMUSCULAR; INTRAVENOUS
Status: DISPENSED
Start: 2021-03-12

## (undated) RX ORDER — DEXAMETHASONE SODIUM PHOSPHATE 4 MG/ML
INJECTION, SOLUTION INTRA-ARTICULAR; INTRALESIONAL; INTRAMUSCULAR; INTRAVENOUS; SOFT TISSUE
Status: DISPENSED
Start: 2017-11-13

## (undated) RX ORDER — BUPIVACAINE HYDROCHLORIDE 5 MG/ML
INJECTION, SOLUTION EPIDURAL; INTRACAUDAL
Status: DISPENSED
Start: 2017-11-13

## (undated) RX ORDER — EPHEDRINE SULFATE 50 MG/ML
INJECTION, SOLUTION INTRAMUSCULAR; INTRAVENOUS; SUBCUTANEOUS
Status: DISPENSED
Start: 2017-11-13

## (undated) RX ORDER — ACETAMINOPHEN 325 MG/1
TABLET ORAL
Status: DISPENSED
Start: 2021-03-12

## (undated) RX ORDER — ONDANSETRON 2 MG/ML
INJECTION INTRAMUSCULAR; INTRAVENOUS
Status: DISPENSED
Start: 2017-11-13

## (undated) RX ORDER — DEXAMETHASONE SODIUM PHOSPHATE 4 MG/ML
INJECTION, SOLUTION INTRA-ARTICULAR; INTRALESIONAL; INTRAMUSCULAR; INTRAVENOUS; SOFT TISSUE
Status: DISPENSED
Start: 2021-03-12

## (undated) RX ORDER — CELECOXIB 200 MG/1
CAPSULE ORAL
Status: DISPENSED
Start: 2021-03-12

## (undated) RX ORDER — CEFAZOLIN SODIUM 2 G/100ML
INJECTION, SOLUTION INTRAVENOUS
Status: DISPENSED
Start: 2017-11-13

## (undated) RX ORDER — GLYCOPYRROLATE 0.2 MG/ML
INJECTION INTRAMUSCULAR; INTRAVENOUS
Status: DISPENSED
Start: 2017-11-13

## (undated) RX ORDER — LIDOCAINE HYDROCHLORIDE 20 MG/ML
INJECTION, SOLUTION EPIDURAL; INFILTRATION; INTRACAUDAL; PERINEURAL
Status: DISPENSED
Start: 2021-03-12

## (undated) RX ORDER — BUPIVACAINE HYDROCHLORIDE 5 MG/ML
INJECTION, SOLUTION EPIDURAL; INTRACAUDAL
Status: DISPENSED
Start: 2017-02-27

## (undated) RX ORDER — CEFAZOLIN SODIUM 2 G/100ML
INJECTION, SOLUTION INTRAVENOUS
Status: DISPENSED
Start: 2021-03-12

## (undated) RX ORDER — LIDOCAINE HYDROCHLORIDE 10 MG/ML
INJECTION, SOLUTION EPIDURAL; INFILTRATION; INTRACAUDAL; PERINEURAL
Status: DISPENSED
Start: 2017-11-13

## (undated) RX ORDER — DEXAMETHASONE SODIUM PHOSPHATE 4 MG/ML
INJECTION, SOLUTION INTRA-ARTICULAR; INTRALESIONAL; INTRAMUSCULAR; INTRAVENOUS; SOFT TISSUE
Status: DISPENSED
Start: 2017-02-27

## (undated) RX ORDER — TRANEXAMIC ACID 650 MG/1
TABLET ORAL
Status: DISPENSED
Start: 2021-03-12

## (undated) RX ORDER — GLYCOPYRROLATE 0.2 MG/ML
INJECTION INTRAMUSCULAR; INTRAVENOUS
Status: DISPENSED
Start: 2017-02-27

## (undated) RX ORDER — CEFAZOLIN SODIUM 1 G/3ML
INJECTION, POWDER, FOR SOLUTION INTRAMUSCULAR; INTRAVENOUS
Status: DISPENSED
Start: 2017-02-27